# Patient Record
Sex: MALE | Race: WHITE | NOT HISPANIC OR LATINO | Employment: FULL TIME | URBAN - NONMETROPOLITAN AREA
[De-identification: names, ages, dates, MRNs, and addresses within clinical notes are randomized per-mention and may not be internally consistent; named-entity substitution may affect disease eponyms.]

---

## 2018-02-14 ENCOUNTER — OFFICE VISIT CONVERTED (OUTPATIENT)
Dept: FAMILY MEDICINE CLINIC | Age: 53
End: 2018-02-14
Attending: NURSE PRACTITIONER

## 2018-08-21 ENCOUNTER — OFFICE VISIT CONVERTED (OUTPATIENT)
Dept: FAMILY MEDICINE CLINIC | Age: 53
End: 2018-08-21
Attending: NURSE PRACTITIONER

## 2019-01-10 ENCOUNTER — HOSPITAL ENCOUNTER (OUTPATIENT)
Dept: OTHER | Facility: HOSPITAL | Age: 54
Discharge: HOME | End: 2019-01-10
Attending: NURSE PRACTITIONER

## 2019-01-10 ENCOUNTER — OFFICE VISIT CONVERTED (OUTPATIENT)
Dept: FAMILY MEDICINE CLINIC | Age: 54
End: 2019-01-10
Attending: NURSE PRACTITIONER

## 2019-01-10 LAB
ALBUMIN SERPL-MCNC: 4.9 G/DL (ref 3.5–5)
ALBUMIN/GLOB SERPL: 1.8 {RATIO} (ref 1.4–2.6)
ALP SERPL-CCNC: 80 U/L (ref 56–119)
ALT SERPL-CCNC: 30 U/L (ref 10–40)
ANION GAP SERPL CALC-SCNC: 19 MMOL/L (ref 8–19)
AST SERPL-CCNC: 19 U/L (ref 15–50)
BILIRUB SERPL-MCNC: 0.59 MG/DL (ref 0.2–1.3)
BUN SERPL-MCNC: 18 MG/DL (ref 5–25)
BUN/CREAT SERPL: 19 {RATIO} (ref 6–20)
CALCIUM SERPL-MCNC: 9.3 MG/DL (ref 8.7–10.4)
CHLORIDE SERPL-SCNC: 99 MMOL/L (ref 99–111)
CHOLEST SERPL-MCNC: 131 MG/DL (ref 107–200)
CHOLEST/HDLC SERPL: 2.8 {RATIO} (ref 3–6)
CONV CO2: 26 MMOL/L (ref 22–32)
CONV TOTAL PROTEIN: 7.6 G/DL (ref 6.3–8.2)
CREAT UR-MCNC: 0.93 MG/DL (ref 0.7–1.2)
EST. AVERAGE GLUCOSE BLD GHB EST-MCNC: 171 MG/DL
GFR SERPLBLD BASED ON 1.73 SQ M-ARVRAT: >60 ML/MIN/{1.73_M2}
GLOBULIN UR ELPH-MCNC: 2.7 G/DL (ref 2–3.5)
GLUCOSE SERPL-MCNC: 133 MG/DL (ref 70–99)
HBA1C MFR BLD: 7.6 % (ref 3.5–5.7)
HDLC SERPL-MCNC: 46 MG/DL (ref 40–60)
LDLC SERPL CALC-MCNC: 73 MG/DL (ref 70–100)
OSMOLALITY SERPL CALC.SUM OF ELEC: 294 MOSM/KG (ref 273–304)
POTASSIUM SERPL-SCNC: 4.2 MMOL/L (ref 3.5–5.3)
SODIUM SERPL-SCNC: 140 MMOL/L (ref 135–147)
TRIGL SERPL-MCNC: 60 MG/DL (ref 40–150)
VLDLC SERPL-MCNC: 12 MG/DL (ref 5–37)

## 2019-08-29 ENCOUNTER — OFFICE VISIT CONVERTED (OUTPATIENT)
Dept: FAMILY MEDICINE CLINIC | Age: 54
End: 2019-08-29
Attending: NURSE PRACTITIONER

## 2019-08-29 ENCOUNTER — HOSPITAL ENCOUNTER (OUTPATIENT)
Dept: OTHER | Facility: HOSPITAL | Age: 54
Discharge: HOME | End: 2019-08-29
Attending: NURSE PRACTITIONER

## 2019-08-29 LAB
ALBUMIN SERPL-MCNC: 4.9 G/DL (ref 3.5–5)
ALBUMIN/GLOB SERPL: 1.8 {RATIO} (ref 1.4–2.6)
ALP SERPL-CCNC: 74 U/L (ref 56–119)
ALT SERPL-CCNC: 25 U/L (ref 10–40)
ANION GAP SERPL CALC-SCNC: 21 MMOL/L (ref 8–19)
AST SERPL-CCNC: 21 U/L (ref 15–50)
BILIRUB SERPL-MCNC: 0.69 MG/DL (ref 0.2–1.3)
BUN SERPL-MCNC: 19 MG/DL (ref 5–25)
BUN/CREAT SERPL: 18 {RATIO} (ref 6–20)
CALCIUM SERPL-MCNC: 9.3 MG/DL (ref 8.7–10.4)
CHLORIDE SERPL-SCNC: 100 MMOL/L (ref 99–111)
CHOLEST SERPL-MCNC: 130 MG/DL (ref 107–200)
CHOLEST/HDLC SERPL: 3.1 {RATIO} (ref 3–6)
CONV CO2: 23 MMOL/L (ref 22–32)
CONV CREATININE URINE, RANDOM: 78.3 MG/DL (ref 10–300)
CONV MICROALBUM.,U,RANDOM: <12 MG/L (ref 0–20)
CONV TOTAL PROTEIN: 7.7 G/DL (ref 6.3–8.2)
CREAT UR-MCNC: 1.08 MG/DL (ref 0.7–1.2)
EST. AVERAGE GLUCOSE BLD GHB EST-MCNC: 223 MG/DL
GFR SERPLBLD BASED ON 1.73 SQ M-ARVRAT: >60 ML/MIN/{1.73_M2}
GLOBULIN UR ELPH-MCNC: 2.8 G/DL (ref 2–3.5)
GLUCOSE SERPL-MCNC: 175 MG/DL (ref 70–99)
HBA1C MFR BLD: 9.4 % (ref 3.5–5.7)
HDLC SERPL-MCNC: 42 MG/DL (ref 40–60)
LDLC SERPL CALC-MCNC: 78 MG/DL (ref 70–100)
MICROALBUMIN/CREAT UR: 15.3 MG/G{CRE} (ref 0–25)
OSMOLALITY SERPL CALC.SUM OF ELEC: 297 MOSM/KG (ref 273–304)
POTASSIUM SERPL-SCNC: 4.1 MMOL/L (ref 3.5–5.3)
SODIUM SERPL-SCNC: 140 MMOL/L (ref 135–147)
TRIGL SERPL-MCNC: 52 MG/DL (ref 40–150)
VLDLC SERPL-MCNC: 10 MG/DL (ref 5–37)

## 2020-01-23 ENCOUNTER — OFFICE VISIT CONVERTED (OUTPATIENT)
Dept: FAMILY MEDICINE CLINIC | Age: 55
End: 2020-01-23
Attending: FAMILY MEDICINE

## 2020-05-11 ENCOUNTER — OFFICE VISIT CONVERTED (OUTPATIENT)
Dept: FAMILY MEDICINE CLINIC | Age: 55
End: 2020-05-11
Attending: NURSE PRACTITIONER

## 2020-05-23 ENCOUNTER — HOSPITAL ENCOUNTER (OUTPATIENT)
Dept: OTHER | Facility: HOSPITAL | Age: 55
Discharge: HOME | End: 2020-05-23
Attending: NURSE PRACTITIONER

## 2020-05-23 LAB
ALBUMIN SERPL-MCNC: 4.8 G/DL (ref 3.5–5)
ALBUMIN/GLOB SERPL: 1.8 {RATIO} (ref 1.4–2.6)
ALP SERPL-CCNC: 85 U/L (ref 56–119)
ALT SERPL-CCNC: 25 U/L (ref 10–40)
ANION GAP SERPL CALC-SCNC: 18 MMOL/L (ref 8–19)
AST SERPL-CCNC: 21 U/L (ref 15–50)
BILIRUB SERPL-MCNC: 0.59 MG/DL (ref 0.2–1.3)
BUN SERPL-MCNC: 18 MG/DL (ref 5–25)
BUN/CREAT SERPL: 18 {RATIO} (ref 6–20)
CALCIUM SERPL-MCNC: 9.4 MG/DL (ref 8.7–10.4)
CHLORIDE SERPL-SCNC: 101 MMOL/L (ref 99–111)
CHOLEST SERPL-MCNC: 113 MG/DL (ref 107–200)
CHOLEST/HDLC SERPL: 2.5 {RATIO} (ref 3–6)
CONV CO2: 23 MMOL/L (ref 22–32)
CONV TOTAL PROTEIN: 7.5 G/DL (ref 6.3–8.2)
CREAT UR-MCNC: 1.01 MG/DL (ref 0.7–1.2)
EST. AVERAGE GLUCOSE BLD GHB EST-MCNC: 223 MG/DL
GFR SERPLBLD BASED ON 1.73 SQ M-ARVRAT: >60 ML/MIN/{1.73_M2}
GLOBULIN UR ELPH-MCNC: 2.7 G/DL (ref 2–3.5)
GLUCOSE SERPL-MCNC: 167 MG/DL (ref 70–99)
HBA1C MFR BLD: 9.4 % (ref 3.5–5.7)
HDLC SERPL-MCNC: 45 MG/DL (ref 40–60)
LDLC SERPL CALC-MCNC: 57 MG/DL (ref 70–100)
OSMOLALITY SERPL CALC.SUM OF ELEC: 292 MOSM/KG (ref 273–304)
POTASSIUM SERPL-SCNC: 4.3 MMOL/L (ref 3.5–5.3)
SODIUM SERPL-SCNC: 138 MMOL/L (ref 135–147)
TRIGL SERPL-MCNC: 55 MG/DL (ref 40–150)
VLDLC SERPL-MCNC: 11 MG/DL (ref 5–37)

## 2020-06-26 ENCOUNTER — HOSPITAL ENCOUNTER (OUTPATIENT)
Dept: OTHER | Facility: HOSPITAL | Age: 55
Discharge: HOME | End: 2020-06-26
Attending: NURSE PRACTITIONER

## 2020-06-26 ENCOUNTER — OFFICE VISIT CONVERTED (OUTPATIENT)
Dept: DIABETES SERVICES | Facility: HOSPITAL | Age: 55
End: 2020-06-26
Attending: NURSE PRACTITIONER

## 2020-08-29 ENCOUNTER — HOSPITAL ENCOUNTER (OUTPATIENT)
Dept: OTHER | Facility: HOSPITAL | Age: 55
Discharge: HOME | End: 2020-08-29
Attending: NURSE PRACTITIONER

## 2020-08-29 LAB
CONV CREATININE URINE, RANDOM: 74.8 MG/DL (ref 10–300)
CONV MICROALBUM.,U,RANDOM: <12 MG/L (ref 0–20)
EST. AVERAGE GLUCOSE BLD GHB EST-MCNC: 151 MG/DL
HBA1C MFR BLD: 6.9 % (ref 3.5–5.7)
MICROALBUMIN/CREAT UR: 16 MG/G{CRE} (ref 0–25)

## 2020-09-04 ENCOUNTER — OFFICE VISIT CONVERTED (OUTPATIENT)
Dept: DIABETES SERVICES | Facility: HOSPITAL | Age: 55
End: 2020-09-04
Attending: NURSE PRACTITIONER

## 2020-09-04 ENCOUNTER — HOSPITAL ENCOUNTER (OUTPATIENT)
Dept: DIABETES SERVICES | Facility: HOSPITAL | Age: 55
Discharge: HOME | End: 2020-09-04
Attending: NURSE PRACTITIONER

## 2020-11-11 ENCOUNTER — OFFICE VISIT CONVERTED (OUTPATIENT)
Dept: FAMILY MEDICINE CLINIC | Age: 55
End: 2020-11-11
Attending: NURSE PRACTITIONER

## 2020-12-11 ENCOUNTER — HOSPITAL ENCOUNTER (OUTPATIENT)
Dept: OTHER | Facility: HOSPITAL | Age: 55
Discharge: HOME | End: 2020-12-11
Attending: NURSE PRACTITIONER

## 2020-12-11 LAB
EST. AVERAGE GLUCOSE BLD GHB EST-MCNC: 148 MG/DL
HBA1C MFR BLD: 6.8 % (ref 3.5–5.7)

## 2020-12-18 ENCOUNTER — HOSPITAL ENCOUNTER (OUTPATIENT)
Dept: DIABETES SERVICES | Facility: HOSPITAL | Age: 55
Discharge: HOME | End: 2020-12-18
Attending: NURSE PRACTITIONER

## 2020-12-18 ENCOUNTER — OFFICE VISIT CONVERTED (OUTPATIENT)
Dept: DIABETES SERVICES | Facility: HOSPITAL | Age: 55
End: 2020-12-18
Attending: NURSE PRACTITIONER

## 2021-03-06 ENCOUNTER — HOSPITAL ENCOUNTER (OUTPATIENT)
Dept: OTHER | Facility: HOSPITAL | Age: 56
Discharge: HOME | End: 2021-03-06
Attending: NURSE PRACTITIONER

## 2021-03-06 LAB
EST. AVERAGE GLUCOSE BLD GHB EST-MCNC: 166 MG/DL
HBA1C MFR BLD: 7.4 % (ref 3.5–5.7)

## 2021-03-12 ENCOUNTER — OFFICE VISIT CONVERTED (OUTPATIENT)
Dept: DIABETES SERVICES | Facility: HOSPITAL | Age: 56
End: 2021-03-12
Attending: NURSE PRACTITIONER

## 2021-03-12 ENCOUNTER — HOSPITAL ENCOUNTER (OUTPATIENT)
Dept: DIABETES SERVICES | Facility: HOSPITAL | Age: 56
Discharge: HOME | End: 2021-03-12
Attending: NURSE PRACTITIONER

## 2021-05-18 NOTE — PROGRESS NOTES
"Cholo Morejon 1965     Office/Outpatient Visit    Visit Date: Wed, Feb 14, 2018 08:54 am    Provider: Miriam Moon N.P. (Assistant: Aarti Orantes MA)    Location: [facility]        Electronically signed by Miriam Moon N.P. on  02/14/2018 10:19:10 AM                             SUBJECTIVE:        CC:     Mr. Morejon is a 52 year old White male.  physical         HPI:         HEALTH RISK PROFILE (\"At Risk\" items are starred):         Smoking: Life-long non-smoker;     Nutrition: eating snack foods;     Alcohol/Drug Use: Rarely drinks alcohol; No illicit drug use;     Safety: Always wears seatbelt;         Concerning type 2 diabetes, compliance with treatment has been fair; he skips some medication doses due to forgetfulness.  Current meds include an oral hypoglycemic ( Glucophage XR ( 1000 mg BID ), invokana, and Januvia ).  He reports home blood glucose readings have averaged fasting readings in the 120-150's mg/dL range.  Most recent lab results include Weight (lb):  192.0 (02/14/2018), Systolic BP:  128 (02/14/2018), Diastolic BP:  70 (02/14/2018), Hemoglobin A1c:  8.5 (02/01/2018), LDL:  81 (mg/dL) (10/30/2017), HDL:  45 (mg/dL) (10/30/2017), Triglycerides:  54 (mg/dL) (10/30/2017), Dilated Eye Exam by date:  06/30/2017 (03/15/2017), Foot Exam (Annual):  10/30/2017 (10/30/2017).      ROS:     CONSTITUTIONAL:  Positive for fatigue.   Negative for chills or fever.      EYES:  Negative for blurred vision.      E/N/T:  Negative for nasal congestion and sore throat.      CARDIOVASCULAR:  Negative for chest pain and palpitations.      RESPIRATORY:  Negative for recent cough and dyspnea.      GASTROINTESTINAL:  Negative for abdominal pain, nausea and vomiting.      MUSCULOSKELETAL:  Negative for myalgias.      INTEGUMENTARY:  Negative for atypical mole(s) and rash.      NEUROLOGICAL:  Negative for paresthesias and weakness.      PSYCHIATRIC:  Positive for (only gets 6 hours of sleep).   " Negative for anxiety or depression.          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         Surgical History:     NONE         Family History:     Father: Coronary Artery Disease     Mother: Coronary Artery Disease;  Type 2 Diabetes     Brother(s): 2 brother(s) total; 1 ;  Lymphoma ( non Hodgkins at 19 y/o )     Sister(s): 1 sister(s) total;  Anxiety     Son(s): Healthy; 1 son(s) total     Daughter(s): Healthy; 2 daughter(s) total     Paternal Grandfather:  at age 60's; Cause of death was cancer     Paternal Grandmother:  at age 80's     Maternal Grandfather:  at age 80's     Maternal Grandmother:  at age 80's;  Type 2 Diabetes         Social History:     Occupation: [employer]     Marital Status:      Children: 3 children         Tobacco/Alcohol/Supplements:     Last Reviewed on 2018 08:58 AM by Aarti Orantes    Tobacco: He has never smoked.          Alcohol: Frequency:    rarely;         Substance Abuse History:     Last Reviewed on 2015 09:10 AM by Miriam Moon    None             Immunizations:     Boostrix (Tdap) 3/23/2016         Allergies:     Last Reviewed on 2018 08:58 AM by Aarti Orantes      No Known Drug Allergies.         Current Medications:     Last Reviewed on 2018 08:59 AM by Aarti Orantes    Invokana 300mg Tablet Take 1 tablet(s) by mouth daily before the first meal of the day.     Januvia 100mg Tablet Take 1 tablet(s) by mouth daily     Lipitor 20mg Tablet 1 tab daily     Metformin HCl 500mg Tablets, Extended Release 2 tabs bid     Glucose Reagent Blood Test Strips  Reagent Strips BID, use glucocoard expression strips         OBJECTIVE:        Vitals:         Current: 2018 8:58:24 AM    Ht:  5 ft, 9.25 in;  Wt: 192 lbs;  WC: 38 inches;  BMI:  28.1    T: 98.7 F (oral);  BP: 128/70 mm Hg (left arm, sitting);  P: 81 bpm (left arm (BP Cuff), sitting);  sCr: 1.01 mg/dL;  GFR: 86.20        Exams:     PHYSICAL EXAM:     GENERAL: Vitals recorded well developed, well nourished;  well groomed;  no apparent distress;     EYES: lids and lacrimal system are normal in appearance; extraocular movements intact; conjunctiva and cornea are normal; PERRLA;     E/N/T: EARS: external auditory canal occluded by cerumen on the left;  the right TM is scarred;     NECK:  supple, full ROM; no thyromegaly; no carotid bruits;     RESPIRATORY: normal respiratory rate and pattern with no distress; normal breath sounds with no rales, rhonchi, wheezes or rubs;     CARDIOVASCULAR: normal rate; rhythm is regular;  no systolic murmur; no edema;     GASTROINTESTINAL: nontender, nondistended; no hepatosplenomegaly or masses; no bruits;     LYMPHATICS:  no adenopathy in cervical, supraclavicular, axillary, or inguinal regions;     SKIN:  no significant rashes or lesions; no suspicious moles;     MUSCULOSKELETAL:  Normal range of motion, strength and tone;     NEUROLOGIC: GROSSLY INTACT     PSYCHIATRIC:  appropriate affect and demeanor; normal speech pattern; grossly normal memory;         ASSESSMENT           V70.0   Z00.00  Annual exam              DDx:     250.00   E11.9  Type 2 diabetes              DDx:     780.79   R53.83  Fatigue              DDx:         ORDERS:         Lab Orders:       [id number]  PHYSM - [facility] MALE PHYSICAL: CMP, CBC,LIPID, PRSAS-, TSH [id number], 31301, 02318, 22937, , 64979  (Send-Out)         77442  VITD - H Vitamin D, 25 Hydroxy  (Send-Out)           Other Orders:       [id number]  Behav assmt w/score & docd/stand instrument  (In-House)                   PLAN:          Annual exam will get colon screen later this year, his work is short staffed at this time; declines flu vaccine     LABORATORY:  Labs ordered to be performed today include MALE PHYSICAL: CMP, CBC,  LIPID, PSA, TSH.      RECOMMENDATIONS given include: Screening Colonoscopy declines.  MIPS PHQ-9 Depression Screening: Completed form scanned and in chart; Total Score 6     COUNSELING was provided today regarding the following topics: healthy eating habits, low cholesterol diet, low salt diet, regular exercise, and use of seat belts.      FOLLOW-UP: Schedule follow-up appointments on a p.r.n. basis.            Orders:       [id number]  PHYS - [facility] MALE PHYSICAL: CMP, CBC,LIPID, PRSAS-, TSH [id number], [id number], 51077, 52275, , 47956  (Send-Out)         30367  Behav assmt w/score & docd/stand instrument  (In-House)             Patient Education Handouts:       Colonoscopy           Type 2 diabetes will switch him off invokana and send his other refill's to [facility] after labs, his last A1C recently was up, he needs to make sure to take his rx every day, monitor sugars          Fatigue will check some labs today     LABORATORY:  Labs ordered to be performed today include Vitamin D.            Orders:       [id number]  VITD - [facility] Vitamin D, 25 Hydroxy  (Send-Out)               Patient Recommendations:        For  Annual exam:     Limit dietary intake of fat (especially saturated fat) and cholesterol.  Eat a variety of foods, including plenty of fruits, vegetables, and grain containg fiber, limit fat intake to 30% of total calories. Balance caloric intake with energy expended. Maintaining regular physical activity is advised to help prevent heart disease, hypertension, diabetes, and obesity. Always use shoulder/lap restraints when driving or riding in a vehicle, even those equipped with air bags.  Schedule follow-up appointments as needed.              CHARGE CAPTURE           **Please note: ICD descriptions below are intended for billing purposes only and may not represent clinical diagnoses**        Primary Diagnosis:         V70.0 Annual exam            Z00.00    Encounter for general adult medical examination without abnormal  findings              Orders:          [id number]   Preventive medicine, established patient, age 40-64 years  (In-House)             50361   Behav assmt w/score & docd/stand instrument  (In-House)           250.00 Type 2 diabetes            E11.9    Type 2 diabetes mellitus without complications              Orders:          57127 -25  Office/outpatient visit; established patient, level 2  (In-House)           780.79 Fatigue            R53.83    Other fatigue

## 2021-05-18 NOTE — PROGRESS NOTES
"Cholo Morejon  1965     Office/Outpatient Visit    Visit Date: Wed, Nov 11, 2020 08:33 am    Provider: Miriam Moon N.P. (Assistant: Nuria Ruiz MA)    Location: [facility]        Electronically signed by Miriam Moon N.P. on  11/11/2020 09:35:32 AM                             Subjective:        CC: Mr. Morejon is a 55 year old White male.  Follow up and physical for work         HPI:           Patient to be evaluated for type 2 diabetes mellitus without complications.  Current meds include an oral hypoglycemic ( Glucophage XR, Invokana ( 300 mg QD ), and Januvia ( 100 mg QD ) ) and Zestril for renoprotection.  He reports home blood glucose readings have been fairly good, with average fasting glucoses running in the 120-150 mg/dL range.  Most recent lab results include Weight (lb):  192.2 (01/23/2020), Hemoglobin A1c:  6.9 (%) (08/29/2020), LDL:  57 (mg/dL) (05/23/2020), HDL:  45 (mg/dL) (05/23/2020), Triglycerides:  55 (mg/dL) (05/23/2020), Microalbuminuria:  16.0 (mg/g creat) (08/29/2020), Dilated Eye Exam by date:  07/13/2020 (05/23/2020), Foot Exam (Annual):  01/10/2019 (01/10/2019).            Concerning mixed hyperlipidemia, current treatment includes Lipitor.  Most recent lab tests include Vitamin D, 25-Hydroxy:  37.7 (ng/mL) (08/21/2018), ALT (SGPT):  25 (U/L) (05/23/2020), AST (SGOT):  21 (U/L) (05/23/2020), Total Cholesterol:  113 (mg/dL) (05/23/2020), HDL:  45 (mg/dL) (05/23/2020), Triglycerides:  55 (mg/dL) (05/23/2020), LDL:  57 (mg/dL) (05/23/2020).            HEALTH RISK PROFILE (\"At Risk\" items are starred):         Smoking: Life-long non-smoker;     Immunization Status: Up to date;     Nutrition: Healthy, well-balanced diet;     Physical Activity: Exercises at least 3 times per week;     Alcohol/Drug Use: Rarely drinks alcohol; No illicit drug use;     Safety: Always wears seatbelt;     ROS:     CONSTITUTIONAL:  Negative for fever.      EYES:  Negative for " blurred vision.      E/N/T:  Negative for sore throat.      CARDIOVASCULAR:  Negative for chest pain, palpitations, tachycardia, orthopnea, and edema.      RESPIRATORY:  Negative for recent cough and dyspnea.      GASTROINTESTINAL:  Negative for abdominal pain.      GENITOURINARY:  Negative for dysuria.      MUSCULOSKELETAL:  Positive for back pain ( left side, lower back, intermittent  issue ).   Negative for feet pain.      INTEGUMENTARY:  Negative for rash.      NEUROLOGICAL:  Negative for dizziness, headaches, paresthesias, and weakness.      PSYCHIATRIC:  Negative for anxiety, depression, and sleep disturbances.          Past Medical History / Family History / Social History:         Last Reviewed on 2020 08:43 AM by Miriam Moon    Past Medical History:             PAST MEDICAL HISTORY         elevated blood pressures     Type 2 Diabetes     basal skin cancer left side of face / an left arm and left scapula   [facility]         PREVENTIVE HEALTH MAINTENANCE             COLORECTAL CANCER SCREENING: Up to date (colonoscopy q10y; sigmoidoscopy q5y; Cologuard q3y) was last done 10-26-18, Results are in chart; colonoscopy with the following abnormalities noted-- spastic colon/ marginal prep         Surgical History:         Procedures:    Colonoscopy (  )         Family History:     Father: Coronary Artery Disease     Mother: Coronary Artery Disease;  Type 2 Diabetes     Brother(s): 2 brother(s) total; 1 ;  Lymphoma ( non Hodgkins at 17 y/o )     Sister(s): 1 sister(s) total;  Anxiety     Son(s): Healthy; 1 son(s) total     Daughter(s): Healthy; 2 daughter(s) total     Paternal Grandfather:  at age 60's; Cause of death was cancer     Paternal Grandmother:  at age 80's     Maternal Grandfather:  at age 80's     Maternal Grandmother:  at age 80's;  Type 2 Diabetes         Social History:     Occupation: [employer]     Marital Status:      Children: 3  children         Tobacco/Alcohol/Supplements:     Last Reviewed on 11/11/2020 08:34 AM by Nuria Ruiz    Tobacco: He has never smoked.          Alcohol: Frequency:    rarely;         Substance Abuse History:     Last Reviewed on 1/23/2020 04:03 PM by Marian Brian    None         Mental Health History:     Last Reviewed on 1/23/2020 04:03 PM by Marian Brian        Communicable Diseases (eg STDs):     Last Reviewed on 1/23/2020 04:03 PM by Marian Brian        Immunizations:     Boostrix (Tdap) 3/23/2016        Allergies:     Last Reviewed on 11/11/2020 08:34 AM by Nuria Ruiz    No Known Allergies.        Current Medications:     Last Reviewed on 11/11/2020 08:34 AM by Nuria Ruiz    metFORMIN 500 mg oral Tablet, Extended Release 24 hr [TAKE 2 TABLETS BY MOUTH TWICE DAILY]    Blood Glucose Test strips  [BID, use glucocoard expression strips]    Januvia 100 mg oral tablet [TAKE ONE TABLET BY MOUTH DAILY]    Invokana 300 mg oral tablet [TAKE ONE TABLET BY MOUTH EVERY MORNING BEFORE FIRST MEAL OF THE DAY]    Lipitor 20 mg oral tablet [1 tab daily]    Vitamin D3 50,000IU Capsules [1 cap weekly for 3mos & then start 5000 IU's daily]    lisinopriL 5 mg oral tablet [1T PO QD]    meclizine 12.5 mg oral tablet [take 1-2 tablets by oral route BID prn vertigo]        Objective:        Vitals:         Current: 11/11/2020 8:36:07 AM    Ht:  5 ft, 9.25 in;  Wt: 181.2 lbs;  WC: 34.5 inches;  BMI: 26.6T: 97.9 F (temporal);  BP: 115/79 mm Hg (right arm, sitting);  P: 80 bpm (right arm (BP Cuff), sitting);  sCr: 1.01 mg/dL;  GFR: 81.38        Exams:     PHYSICAL EXAM:     GENERAL: Vitals recorded well developed, well nourished;  well groomed;  no apparent distress;     EYES: lids and lacrimal system are normal in appearance; extraocular movements intact; conjunctiva and cornea are normal; PERRLA;     E/N/T:  normal EACs, TMs, nasal/oral mucosa, teeth, gingiva, and oropharynx;     NECK:  supple, full ROM; no  thyromegaly; no carotid bruits;     RESPIRATORY: normal respiratory rate and pattern with no distress; normal breath sounds with no rales, rhonchi, wheezes or rubs;     CARDIOVASCULAR: normal rate; rhythm is regular;  no systolic murmur; no edema;     GASTROINTESTINAL: nontender; normal bowel sounds; no organomegaly;     LYMPHATIC: no enlargement of cervical or facial nodes;     SKIN:  no significant rashes or lesions; no suspicious moles;     MUSCULOSKELETAL:  Normal range of motion, strength and tone;     NEUROLOGIC: GROSSLY INTACT     PSYCHIATRIC:  appropriate affect and demeanor; normal speech pattern; grossly normal memory;         Assessment:         E11.9   Type 2 diabetes mellitus without complications       E78.2   Mixed hyperlipidemia       Z00.00   Encounter for general adult medical examination without abnormal findings           ORDERS:         Meds Prescribed:       [Refilled] lisinopriL 5 mg oral tablet [1T PO QD], #90 (ninety) tablets, Refills: 1 (one)       [Refilled] Lipitor 20 mg oral tablet [1 tab daily], #90 (ninety) tablets, Refills: 1 (one)       [Refilled] metFORMIN 500 mg oral Tablet, Extended Release 24 hr [TAKE 2 TABLETS BY MOUTH TWICE DAILY], #360 (three hundred and sixty) tablets, Refills: 1 (one)                 Plan:         Type 2 diabetes mellitus without complicationshas an order from NIKHIL Bennett for /declines flu vaccine /reviewed diabetes flow sheet        FOLLOW-UP: next month for labs           Prescriptions:       [Refilled] lisinopriL 5 mg oral tablet [1T PO QD], #90 (ninety) tablets, Refills: 1 (one)       [Refilled] metFORMIN 500 mg oral Tablet, Extended Release 24 hr [TAKE 2 TABLETS BY MOUTH TWICE DAILY], #360 (three hundred and sixty) tablets, Refills: 1 (one)         Mixed hyperlipidemia          Prescriptions:       [Refilled] Lipitor 20 mg oral tablet [1 tab daily], #90 (ninety) tablets, Refills: 1 (one)         Encounter for general adult medical examination without  abnormal findingshe has a lab order to complete labs in         COUNSELING was provided today regarding the following topics: healthy eating habits, regular exercise, and use of seat belts.              Patient Recommendations:        For  Encounter for general adult medical examination without abnormal findings:    Limit dietary intake of fat (especially saturated fat) and cholesterol.  Eat a variety of foods, including plenty of fruits, vegetables, and grain containg fiber, limit fat intake to 30% of total calories. Balance caloric intake with energy expended. Maintaining regular physical activity is advised to help prevent heart disease, hypertension, diabetes, and obesity. Always use shoulder/lap restraints when driving or riding in a vehicle, even those equipped with air bags.              Charge Capture:         Primary Diagnosis:     E11.9  Type 2 diabetes mellitus without complications     E78.2  Mixed hyperlipidemia     Z00.00  Encounter for general adult medical examination without abnormal findings           Orders:      [id number]  Preventive medicine, established patient, age 40-64 years  (In-House)

## 2021-05-18 NOTE — PROGRESS NOTES
"Cholo Morejon 1965     Office/Outpatient Visit    Visit Date: Thu, Darin 10, 2019 09:49 am    Provider: Miriam Moon N.P. (Assistant: Ana Luisa Ghosh MA)    Location: [facility]        Electronically signed by Miriam Moon N.P. on  01/10/2019 11:06:12 AM                             SUBJECTIVE:        CC:     Mr. Morejon is a 53 year old White male.  This is a follow-up visit.  diabetes check up, biometric paperwork         HPI:         HEALTH RISK PROFILE (\"At Risk\" items are starred):         Smoking: Life-long non-smoker;     Immunization Status: Up to date;     Nutrition: Healthy, well-balanced diet;     Physical Activity: Exercises at least 3 times per week;     Alcohol/Drug Use: Rarely drinks alcohol; No illicit drug use;     Safety: Always wears seatbelt;     Skin Exams: Routinely examines skin;         Concerning type 2 diabetes, current meds include an oral hypoglycemic ( Glucophage XR ( 2000 mg QD ) and Invokana, januvia ).  He reports home blood glucose readings have been fairly good, with average fasting glucoses running in the 120-150 mg/dL range.  Most recent lab results include Hemoglobin A1c:  9.0 (%) (08/21/2018), LDL:  89 (mg/dL) (08/21/2018), HDL:  41 (mg/dL) (08/21/2018), Triglycerides:  67 (mg/dL) (08/21/2018), Microalbuminuria:  22.1 (mg/g creat) (08/21/2018), Dilated Eye Exam by date:  07/09/2018 (08/21/2018), Foot Exam (Annual):  10/30/2017 (10/30/2017).          With regard to the hypercholesterolemia, current treatment includes Lipitor.  Compliance with treatment has been good; he takes his medication as directed.  He denies experiencing any hypercholesterolemia related symptoms.      ROS:     CONSTITUTIONAL:  Negative for chills and fever.      EYES:  Negative for blurred vision.      E/N/T:  Negative for nasal congestion and sore throat.      CARDIOVASCULAR:  Negative for chest pain and palpitations.      RESPIRATORY:  Negative for recent cough and dyspnea.      " GASTROINTESTINAL:  Negative for abdominal pain, nausea and vomiting.      MUSCULOSKELETAL:  Negative for myalgias.      INTEGUMENTARY:  Negative for rash.      NEUROLOGICAL:  Negative for paresthesias and weakness.      PSYCHIATRIC:  Negative for anxiety and depression.          PMH/FMH/SH:     Last Reviewed on 1/10/2019 10:02 AM by Miriam Moon    Past Medical History:             PAST MEDICAL HISTORY         elevated blood pressures     Type 2 Diabetes     basal skin cancer left side of face / an left arm and left scapula   [facility]         PREVENTIVE HEALTH MAINTENANCE             COLORECTAL CANCER SCREENING: Up to date (colonoscopy q10y; sigmoidoscopy q5y; Cologuard q3y) was last done 10-26-18, Results are in chart; colonoscopy with the following abnormalities noted-- spastic colon/ marginal prep         Surgical History:         Procedures:    Colonoscopy (  )         Family History:     Father: Coronary Artery Disease     Mother: Coronary Artery Disease;  Type 2 Diabetes     Brother(s): 2 brother(s) total; 1 ;  Lymphoma ( non Hodgkins at 19 y/o )     Sister(s): 1 sister(s) total;  Anxiety     Son(s): Healthy; 1 son(s) total     Daughter(s): Healthy; 2 daughter(s) total     Paternal Grandfather:  at age 60's; Cause of death was cancer     Paternal Grandmother:  at age 80's     Maternal Grandfather:  at age 80's     Maternal Grandmother:  at age 80's;  Type 2 Diabetes         Social History:     Occupation: [employer]     Marital Status:      Children: 3 children         Tobacco/Alcohol/Supplements:     Last Reviewed on 1/10/2019 09:51 AM by Ana Luisa Ghosh    Tobacco: He has never smoked.          Alcohol: Frequency:    rarely;         Substance Abuse History:     Last Reviewed on 2015 09:10 AM by Miriam Moon    None             Immunizations:     Boostrix (Tdap) 3/23/2016         Allergies:     Last Reviewed on 1/10/2019 09:51 AM by  Ana Luisa Ghosh      No Known Drug Allergies.         Current Medications:     Last Reviewed on 1/10/2019 09:52 AM by Ana Luisa Ghosh    Invokana 300mg Tablet Take 1 tablet(s) by mouth daily before the first meal of the day.     Januvia 100mg Tablet Take 1 tablet(s) by mouth daily     Lipitor 20mg Tablet 1 tab daily     Metformin HCl 500mg Tablets, Extended Release 2 tabs bid     Vitamin D3 50,000IU Capsules 1 cap weekly for 3mos & then start 5000 IU's daily     Glucose Reagent Blood Test Strips  Reagent Strips BID, use glucocoard expression strips         OBJECTIVE:        Vitals:         Current: 1/10/2019 9:54:16 AM    Ht:  5 ft, 9.25 in;  Wt: 190.8 lbs;  WC: 36 inches;  BMI: 28.0    T: 97.4 F (oral);  BP: 139/80 mm Hg (left arm, sitting);  P: 73 bpm (left arm (BP Cuff), sitting);  sCr: 1.02 mg/dL;  GFR: 84.21        Exams:     PHYSICAL EXAM:     GENERAL: Vitals recorded well developed, well nourished;  well groomed;  no apparent distress;     EYES: lids and lacrimal system are normal in appearance; extraocular movements intact; conjunctiva and cornea are normal; PERRLA;     E/N/T:  normal EACs, TMs, nasal/oral mucosa, teeth, gingiva, and oropharynx;     NECK:  supple, full ROM; no thyromegaly; no carotid bruits;     RESPIRATORY: normal respiratory rate and pattern with no distress; normal breath sounds with no rales, rhonchi, wheezes or rubs;     CARDIOVASCULAR: normal rate; rhythm is regular;  no systolic murmur; no edema;     GASTROINTESTINAL: nontender; normal bowel sounds; no organomegaly;     LYMPHATIC: no enlargement of cervical or facial nodes;     SKIN: skin of feet dry, slightly thickened toenails, trimmed neatly;     MUSCULOSKELETAL:  Normal range of motion, strength and tone;     NEUROLOGIC: monofilament intact to bilateral feet; GROSSLY INTACT     PSYCHIATRIC:  appropriate affect and demeanor; normal speech pattern; grossly normal memory;         ASSESSMENT           V70.0   Z00.00  Annual  physical              DDx:     250.00   E11.9  Type 2 diabetes              DDx:     272.0   E78.2  Hypercholesterolemia              DDx:         ORDERS:         Meds Prescribed:       Refill of: Invokana (Canagliflozin) 300mg Tablet Take 1 tablet(s) by mouth daily before the first meal of the day.  #90 (Ninety) tablet(s) Refills: 1       Refill of: Januvia (Sitagliptin Phosphate) 100mg Tablet Take 1 tablet(s) by mouth daily  #90 (Ninety) tablet(s) Refills: 1       Refill of: Metformin HCl 500mg Tablets, Extended Release 2 tabs bid  #360 ([place] [address] Sixty) tablet(s) Refills: 1       Lisinopril 5mg Tablet 1 tab daily  #30 (Thirty) tablet(s) Refills: 2       Refill of: Lipitor (Atorvastatin Calcium) 20mg Tablet 1 tab daily  #90 (Ninety) tablet(s) Refills: 1         Lab Orders:       [id number]  [address] [place] [address] LIPID,CMP, A1C: 77563, 86013, 23595  (Send-Out)                   PLAN:          Annual physical declines flu and hep a vaccines         COUNSELING was provided today regarding the following topics: healthy eating habits, regular exercise, and use of seat belts.      FOLLOW-UP: Schedule follow-up appointments on a p.r.n. basis.            Patient Education Handouts:       Hepatitis A           Type 2 diabetes will start him on a low dose lisinopril 5 mg /he is not due an eye exam, sees woody/updated his diabetes flow sheet      LABORATORY:  Labs ordered to be performed today include Diabetes Panel 1; CMP, Lipid, A1C.      COUNSELING: The patient was counseled concerning the relationship between diabetes control and macrovascular disease including cardiovascular, cerebrovascular and peripheral vascular disease. The patient was counseled concerning the relationship between diabetes control and retinopathy, nephropathy, and neuropathy. Advised as to the targets of pre-meal glucoses ( mg/dl) and postmeal glucoses (<140-160 mg/dl)     FOLLOW-UP: Schedule a follow-up visit in 3 months. recheck bp            Prescriptions:       Refill of: Invokana (Canagliflozin) 300mg Tablet Take 1 tablet(s) by mouth daily before the first meal of the day.  #90 (Ninety) tablet(s) Refills: 1       Refill of: Januvia (Sitagliptin Phosphate) 100mg Tablet Take 1 tablet(s) by mouth daily  #90 (Ninety) tablet(s) Refills: 1       Refill of: Metformin HCl 500mg Tablets, Extended Release 2 tabs bid  #360 ([address] [place] [address] Sixty) tablet(s) Refills: 1       Lisinopril 5mg Tablet 1 tab daily  #30 (Thirty) tablet(s) Refills: 2           Orders:       [id number]  [address] LIPID,CMP, A1C: 59709, 77191, 11000  (Send-Out)            Hypercholesterolemia           Prescriptions:       Refill of: Lipitor (Atorvastatin Calcium) 20mg Tablet 1 tab daily  #90 (Ninety) tablet(s) Refills: 1             Patient Recommendations:        For  Annual physical:     Limit dietary intake of fat (especially saturated fat) and cholesterol.  Eat a variety of foods, including plenty of fruits, vegetables, and grain containg fiber, limit fat intake to 30% of total calories. Balance caloric intake with energy expended. Maintaining regular physical activity is advised to help prevent heart disease, hypertension, diabetes, and obesity. Always use shoulder/lap restraints when driving or riding in a vehicle, even those equipped with air bags.  Schedule follow-up appointments as needed.          For  Type 2 diabetes:     Schedule a follow-up visit in 3 months.              CHARGE CAPTURE           **Please note: ICD descriptions below are intended for billing purposes only and may not represent clinical diagnoses**        Primary Diagnosis:         V70.0 Annual physical            Z00.00    Encntr for general adult medical exam w/o abnormal findings              Orders:          [id number]   Preventive medicine, established patient, age 40-64 years  (In-House)           250.00 Type 2 diabetes            E11.9    Type 2 diabetes mellitus without complications    272.0  Hypercholesterolemia            E78.2    Mixed hyperlipidemia

## 2021-05-18 NOTE — PROGRESS NOTES
Cholo Morejon 1965     Office/Outpatient Visit    Visit Date: Tue, Aug 21, 2018 08:27 am    Provider: Miriam Moon N.P. (Assistant: Ana Luisa Ghosh MA)    Location: [facility]        Electronically signed by Miriam Moon N.P. on  08/21/2018 12:53:05 PM                             SUBJECTIVE:        CC:     Mr. Morejon is a 52 year old White male.  This is a follow-up visit.  diabetes check up         HPI:         Mr. Morejon presents with hypercholesterolemia.  Current treatment includes Lipitor.  Compliance with treatment has been good; he takes his medication as directed.  He denies experiencing any hypercholesterolemia related symptoms.          In regard to the type 2 diabetes, current meds include an oral hypoglycemic ( Glucophage XR ( 2000 mg per day ), Farxiga, and januvia ).  He reports home blood glucose readings have averaged fasting readings in the 140-180 mg/dL range.  Most recent lab results include Hemoglobin A1c:  8.5 (02/01/2018), LDL:  91 (mg/dL) (02/14/2018), Microalbuminuria:  12.6 (mg/g creat) (03/15/2017), Dilated Eye Exam by date:  06/30/2017 (03/15/2017), Foot Exam (Annual):  10/30/2017 (10/30/2017).      ROS:     CONSTITUTIONAL:  Negative for chills, fatigue, fever, and weight change.      CARDIOVASCULAR:  Negative for chest pain, palpitations, tachycardia, orthopnea, and edema.      RESPIRATORY:  Negative for cough, dyspnea, and hemoptysis.      GASTROINTESTINAL:  Positive for nausea.   Negative for melena.  he had nausea a few weeks ago, symptoms lingering but improving     NEUROLOGICAL:  Negative for dizziness, headaches, paresthesias, and weakness.          PM/FM/SH:     Last Reviewed on 8/21/2018 08:51 AM by Miriam Moon    Past Medical History:             PAST MEDICAL HISTORY         elevated blood pressures     Type 2 Diabetes     basal skin cancer left side of face 9-2017         Surgical History:     NONE         Family History:     Father: Coronary  Artery Disease     Mother: Coronary Artery Disease;  Type 2 Diabetes     Brother(s): 2 brother(s) total; 1 ;  Lymphoma ( non Hodgkins at 19 y/o )     Sister(s): 1 sister(s) total;  Anxiety     Son(s): Healthy; 1 son(s) total     Daughter(s): Healthy; 2 daughter(s) total     Paternal Grandfather:  at age 60's; Cause of death was cancer     Paternal Grandmother:  at age 80's     Maternal Grandfather:  at age 80's     Maternal Grandmother:  at age 80's;  Type 2 Diabetes         Social History:     Occupation: [employer]     Marital Status:      Children: 3 children         Tobacco/Alcohol/Supplements:     Last Reviewed on 2018 08:29 AM by Ana Luisa Ghosh    Tobacco: He has never smoked.          Alcohol: Frequency:    rarely;         Substance Abuse History:     Last Reviewed on 2015 09:10 AM by Miriam Moon             Immunizations:     Boostrix (Tdap) 3/23/2016         Allergies:     Last Reviewed on 2018 08:29 AM by Ana Luisa Ghosh      No Known Drug Allergies.         Current Medications:     Last Reviewed on 2018 08:30 AM by Ana Luisa Ghosh    Metformin HCl 500mg Tablets, Extended Release 2 tabs bid     Farxiga 10mg Tablet Take 1 tablet(s) by mouth daily     Januvia 100mg Tablet Take 1 tablet(s) by mouth daily     Lipitor 20mg Tablet 1 tab daily     Vitamin D3 50,000IU Capsules 1 cap weekly for 3mos & then start 5000 IU's daily     Glucose Reagent Blood Test Strips  Reagent Strips BID, use glucocoard expression strips         OBJECTIVE:        Vitals:         Current: 2018 8:31:53 AM    Ht:  5 ft, 9.25 in;  Wt: 186.8 lbs;  BMI: 27.4    T: 98.6 F (oral);  BP: 136/78 mm Hg (left arm, sitting);  P: 79 bpm (left arm (BP Cuff), sitting);  sCr: 1.04 mg/dL;  GFR: 82.75        Exams:     PHYSICAL EXAM:     GENERAL: Vitals recorded well developed, well nourished;  well groomed;  no apparent distress;     NECK: carotid exam reveals no  bruits;     RESPIRATORY: normal respiratory rate and pattern with no distress; normal breath sounds with no rales, rhonchi, wheezes or rubs;     CARDIOVASCULAR: normal rate; rhythm is regular;  no systolic murmur; no edema;     PSYCHIATRIC:  appropriate affect and demeanor; normal speech pattern; grossly normal memory;         ASSESSMENT           272.0   E78.5  Hypercholesterolemia              DDx:     250.00   E11.9  Type 2 diabetes              DDx:     268.9   E55.9  Vitamin D deficiency, unspecified              DDx:     V76.41   Z12.12  Screening for rectal cancer              DDx:         ORDERS:         Meds Prescribed:       Refill of: Lipitor (Atorvastatin Calcium) 20mg Tablet 1 tab daily  #90 (Ninety) tablet(s) Refills: 1       Refill of: Metformin HCl 500mg Tablets, Extended Release 2 tabs bid  #360 ([place] [address] Sixty) tablet(s) Refills: 1       Refill of: Farxiga (Dapagliflozin) 10mg Tablet Take 1 tablet(s) by mouth daily  #90 (Ninety) tablet(s) Refills: 1       Refill of: Januvia (Sitagliptin Phosphate) 100mg Tablet Take 1 tablet(s) by mouth daily  #90 (Ninety) tablet(s) Refills: 1         Lab Orders:       [id number]  DIAB2 - [facility] CMP A1C LIPID AND MICRO ALBUM CR RATIO: [id number]  (Send-Out)         72887  VITD - [facility] Vitamin D, 25 Hydroxy  (Send-Out)           Procedures Ordered:       REFER  Referral to Specialist or Other Facility  (Send-Out)                   PLAN:          Hypercholesterolemia recommend hep a vaccines         RECOMMENDATIONS given include: low cholesterol/low fat diet.      FOLLOW-UP: Schedule a follow-up visit in 6 months.            Prescriptions:       Refill of: Lipitor (Atorvastatin Calcium) 20mg Tablet 1 tab daily  #90 (Ninety) tablet(s) Refills: 1           Patient Education Handouts:       Hepatitis A           Type 2 diabetes send for july eye exam /woody /reviewed diabetes flow sheet     LABORATORY:  Labs ordered to be performed today include  Diabetes Panel 2;CMP, A1C, Lipid, Microalbumin:Creatinine Ratio.            Prescriptions:       Refill of: Metformin HCl 500mg Tablets, Extended Release 2 tabs bid  #360 ([place] [address] Sixty) tablet(s) Refills: 1       Refill of: Farxiga (Dapagliflozin) 10mg Tablet Take 1 tablet(s) by mouth daily  #90 (Ninety) tablet(s) Refills: 1       Refill of: Januvia (Sitagliptin Phosphate) 100mg Tablet Take 1 tablet(s) by mouth daily  #90 (Ninety) tablet(s) Refills: 1           Orders:       [id number]  DIAB2 - [facility] CMP A1C LIPID AND MICRO ALBUM CR RATIO: [id number],39353,49050  (Send-Out)            Vitamin D deficiency, unspecified he is taking OTC 5000 IU of vit d supplement now     LABORATORY:  Labs ordered to be performed today include Vitamin D.            Orders:       [id number]  VITD - [facility] Vitamin D, 25 Hydroxy  (Send-Out)            Screening for rectal cancer he request flaget         REFERRALS:  Referral initiated to a general surgeon ( a colonoscopy ).            Orders:       REFER  Referral to Specialist or Other Facility  (Send-Out)               Patient Recommendations:        For  Hypercholesterolemia:     Reduce the amount of cholesterol and saturated fat in your diet.  Schedule a follow-up visit in 6 months.              CHARGE CAPTURE           **Please note: ICD descriptions below are intended for billing purposes only and may not represent clinical diagnoses**        Primary Diagnosis:         272.0 Hypercholesterolemia            E78.5    Hyperlipidemia, unspecified              Orders:          [id number]   Office/outpatient visit; established patient, level 4  (In-House)           250.00 Type 2 diabetes            E11.9    Type 2 diabetes mellitus without complications    268.9 Vitamin D deficiency, unspecified            E55.9    Vitamin D deficiency, unspecified    V76.41 Screening for rectal cancer            Z12.12    Encounter for screening for malignant neoplasm of rectum        ADDENDUMS:       ____________________________________    Date: 08/21/2018 09:13 AM    Author: Chelly Griffith         Visit Note Faxed to:        Percy Cruz  (General Surgery); Number [phone]

## 2021-05-18 NOTE — PROGRESS NOTES
Cholo Morejon  1965     Office/Outpatient Visit    Visit Date: Mon, May 11, 2020 09:09 am    Provider: Miriam Moon N.P. (Assistant: Shari Pereira RN)    Location: [facility]        Electronically signed by Miriam Moon N.P. on  05/11/2020 11:46:59 AM                             Subjective:        CC: Mr. Morejon is a 54 year old White male.  Medication refill--Doximity video- [phone]         HPI: Cholo consented to this telemedicine visit.    - Persons present during the telemedicine consultation include: Cholo - patient, ANEL Red APRN    - This visit is being conducted over  audio and video          Mr. Morejon presents with type 2 diabetes mellitus without complications.  Current meds include an oral hypoglycemic ( Glucophage XR and Januvia & Invokana ) and Zestril.  He does not perform home blood glucose monitoring.  Most recent lab results include Hemoglobin A1c:  9.4 (%) (08/29/2019), LDL:  78 (mg/dL) (08/29/2019), HDL:  42 (mg/dL) (08/29/2019), Triglycerides:  52 (mg/dL) (08/29/2019), Microalbuminuria:  15.3 (mg/g creat) (08/29/2019), Dilated Eye Exam by date:  07/12/2019 (08/29/2019), Foot Exam (Annual):  01/10/2019 (01/10/2019).            Mixed hyperlipidemia details; current treatment includes Lipitor.  Compliance with treatment has been fair; he does not follow a diet and exercise regimen.  He denies experiencing any hypercholesterolemia related symptoms.      ROS:     CONSTITUTIONAL:  Negative for fever.      EYES:  Negative for blurred vision.      CARDIOVASCULAR:  Negative for chest pain, palpitations, tachycardia, orthopnea, and edema.      RESPIRATORY:  Negative for recent cough and dyspnea.      GENITOURINARY:  Positive for erectile dysfunction.  has has had some issues with this     MUSCULOSKELETAL:  Negative for feet pain.      NEUROLOGICAL:  Negative for dizziness.          Past Medical History / Family History / Social History:         Last Reviewed on  2020 09:49 AM by Miriam Moon    Past Medical History:             PAST MEDICAL HISTORY         elevated blood pressures     Type 2 Diabetes     basal skin cancer left side of face / an left arm and left scapula   [facility]         PREVENTIVE HEALTH MAINTENANCE             COLORECTAL CANCER SCREENING: Up to date (colonoscopy q10y; sigmoidoscopy q5y; Cologuard q3y) was last done 10-26-18, Results are in chart; colonoscopy with the following abnormalities noted-- spastic colon/ marginal prep         Surgical History:         Procedures:    Colonoscopy (  )         Family History:     Father: Coronary Artery Disease     Mother: Coronary Artery Disease;  Type 2 Diabetes     Brother(s): 2 brother(s) total; 1 ;  Lymphoma ( non Hodgkins at 17 y/o )     Sister(s): 1 sister(s) total;  Anxiety     Son(s): Healthy; 1 son(s) total     Daughter(s): Healthy; 2 daughter(s) total     Paternal Grandfather:  at age 60's; Cause of death was cancer     Paternal Grandmother:  at age 80's     Maternal Grandfather:  at age 80's     Maternal Grandmother:  at age 80's;  Type 2 Diabetes         Social History:     Occupation: [employer]     Marital Status:      Children: 3 children         Tobacco/Alcohol/Supplements:     Last Reviewed on 2020 09:10 AM by Shari Pereira    Tobacco: He has never smoked.          Alcohol: Frequency:    rarely;         Substance Abuse History:     Last Reviewed on 2020 04:03 PM by Marian Brian    None         Mental Health History:     Last Reviewed on 2020 04:03 PM by Marian Brian        Communicable Diseases (eg STDs):     Last Reviewed on 2020 04:03 PM by Marian Brian        Immunizations:     Boostrix (Tdap) 3/23/2016        Allergies:     Last Reviewed on 2020 09:10 AM by Shari Pereira    No Known Allergies.        Current Medications:     Last Reviewed on 2020 09:10 AM by Shari Pereira    metFORMIN 500  mg oral Tablet, Extended Release 24 hr [2 tabs bid]    Blood Glucose Test strips  [BID, use glucocoard expression strips]    Januvia 100 mg oral tablet [TAKE ONE TABLET BY MOUTH DAILY]    Lipitor 20mg Tablet [1 tab daily]    Invokana 300 mg oral tablet [TAKE ONE TABLET BY MOUTH EVERY MORNING BEFORE FIRST MEAL OF THE DAY]    Vitamin D3 50,000IU Capsules [1 cap weekly for 3mos & then start 5000 IU's daily]    lisinopriL 5 mg oral tablet [1T PO QD]    meclizine 12.5 mg oral tablet [take 1-2 tablets by oral route BID prn vertigo]        Objective:        Exams:     PHYSICAL EXAM:     GENERAL: Vitals recorded well developed, well nourished;  well groomed;  no apparent distress;     RESPIRATORY: normal appearance and symmetric expansion of chest wall;     NEUROLOGIC: GROSSLY INTACT     PSYCHIATRIC:  appropriate affect and demeanor; normal speech pattern; grossly normal memory;         Assessment:         E11.9   Type 2 diabetes mellitus without complications       E78.2   Mixed hyperlipidemia           ORDERS:         Meds Prescribed:       [Refilled] metFORMIN 500 mg oral Tablet, Extended Release 24 hr [2 tabs bid], #360 (three hundred and sixty) tablets, Refills: 0 (zero)       [Refilled] Invokana 300 mg oral tablet [TAKE ONE TABLET BY MOUTH EVERY MORNING BEFORE FIRST MEAL OF THE DAY], #90 (ninety) tablets, Refills: 0 (zero)       [Refilled] Januvia 100 mg oral tablet [TAKE ONE TABLET BY MOUTH DAILY], #90 (ninety) tablets, Refills: 0 (zero)       [Refilled] lisinopriL 5 mg oral tablet [1T PO QD], #90 (ninety) tablets, Refills: 0 (zero)       [Refilled] Lipitor 20 mg oral tablet [1 tab daily], #90 (ninety) tablets, Refills: 1 (one)       [Refilled] Blood Glucose Test strips  [BID, use glucocoard expression strips], #100 (one hundred) strips, Refills: 1 (one)         Lab Orders:       FUTURE  Future order to be done at patients convenience  (Send-Out)            [id number]  [address] LIPID,CMP, A1C: 82001, [id number], [id number]   (Send-Out)                      Plan:         Type 2 diabetes mellitus without complicationshe will be due a foot exam at next visit in the office, not due eye exam until July 2020    Telehealth: Verbal consent obtained for visit to occur via televideo conferencing     FOLLOW-UP TESTING #1: FOLLOW-UP LABORATORY:  Labs to be scheduled in the future include Diabetes Panel 1; CMP, Lipid, A1C.      start testing glucose, check his feet daily           Prescriptions:       [Refilled] metFORMIN 500 mg oral Tablet, Extended Release 24 hr [2 tabs bid], #360 (three hundred and sixty) tablets, Refills: 0 (zero)       [Refilled] Invokana 300 mg oral tablet [TAKE ONE TABLET BY MOUTH EVERY MORNING BEFORE FIRST MEAL OF THE DAY], #90 (ninety) tablets, Refills: 0 (zero)       [Refilled] Januvia 100 mg oral tablet [TAKE ONE TABLET BY MOUTH DAILY], #90 (ninety) tablets, Refills: 0 (zero)       [Refilled] lisinopriL 5 mg oral tablet [1T PO QD], #90 (ninety) tablets, Refills: 0 (zero)       [Refilled] Blood Glucose Test strips  [BID, use glucocoard expression strips], #100 (one hundred) strips, Refills: 1 (one)           Orders:       FUTURE  Future order to be done at patients convenience  (Send-Out)            [id number]  [address] LIPID,CMP, A1C: [id number], [id number], [id number]  (Send-Out)              Mixed hyperlipidemia        RECOMMENDATIONS given include: exercise and low cholesterol/low fat diet.      FOLLOW-UP: fasting for labs order to be mailed to pt           Prescriptions:       [Refilled] Lipitor 20 mg oral tablet [1 tab daily], #90 (ninety) tablets, Refills: 1 (one)             Patient Recommendations:        For  Type 2 diabetes mellitus without complications:            The following laboratory testing has been ordered:         For  Mixed hyperlipidemia:    Maintain a regular exercise program. Reduce the amount of cholesterol and saturated fat in your diet.              Charge Capture:         Primary Diagnosis:     E11.9  Type 2  diabetes mellitus without complications           Orders:      78460  Office/outpatient visit; established patient, level 3  (In-House)              E78.2  Mixed hyperlipidemia

## 2021-05-18 NOTE — PROGRESS NOTES
Cholo Morejon. 1965     Office/Outpatient Visit    Visit Date: Thu, Aug 29, 2019 08:57 am    Provider: Miriam Moon N.P. (Assistant: Ana Luisa Ghosh MA)    Location: [facility]        Electronically signed by Miriam Moon N.P. on  08/29/2019 10:55:49 AM                             SUBJECTIVE:        CC:     Mr. Morejon is a 53 year old White male.  This is a follow-up visit.  check up on meds         HPI:         PHQ-9 Depression Screening: Completed form scanned and in chart; Total Score 1 Alcohol Consumption Screening: Completed form scanned and in chart; Total Score 1         In regard to the type 2 diabetes, current meds include an oral hypoglycemic ( Glucophage XR ( 2000 mg QD ) and Invokana ) and Zestril.  He reports home blood glucose readings have been fairly good, with average fasting glucoses running in the 120-150 mg/dL range.  Most recent lab results include Hemoglobin A1c:  7.6 (%) (01/10/2019), LDL:  73 (mg/dL) (01/10/2019), HDL:  46 (mg/dL) (01/10/2019), Triglycerides:  60 (mg/dL) (01/10/2019), Microalbuminuria:  22.1 (mg/g creat) (08/21/2018), Dilated Eye Exam by date:  07/09/2018 (08/21/2018), Foot Exam (Annual):  01/10/2019 (01/10/2019).          With regard to the hypercholesterolemia, current treatment includes Lipitor.  Compliance with treatment has been good; he takes his medication as directed.  He denies experiencing any hypercholesterolemia related symptoms.      ROS:     CONSTITUTIONAL:  Negative for chills, fatigue, fever, and weight change.      CARDIOVASCULAR:  Negative for chest pain, palpitations, tachycardia, orthopnea, and edema.      RESPIRATORY:  Negative for cough, dyspnea, and hemoptysis.      NEUROLOGICAL:  Negative for dizziness, headaches, paresthesias, and weakness.          PMH/FMH/SH:     Last Reviewed on 8/29/2019 09:12 AM by Miriam Moon    Past Medical History:             PAST MEDICAL HISTORY         elevated blood pressures     Type 2  Diabetes     basal skin cancer left side of face / an left arm and left scapula   [facility]         PREVENTIVE HEALTH MAINTENANCE             COLORECTAL CANCER SCREENING: Up to date (colonoscopy q10y; sigmoidoscopy q5y; Cologuard q3y) was last done 10-26-18, Results are in chart; colonoscopy with the following abnormalities noted-- spastic colon/ marginal prep         Surgical History:         Procedures:    Colonoscopy (  )         Family History:     Father: Coronary Artery Disease     Mother: Coronary Artery Disease;  Type 2 Diabetes     Brother(s): 2 brother(s) total; 1 ;  Lymphoma ( non Hodgkins at 17 y/o )     Sister(s): 1 sister(s) total;  Anxiety     Son(s): Healthy; 1 son(s) total     Daughter(s): Healthy; 2 daughter(s) total     Paternal Grandfather:  at age 60's; Cause of death was cancer     Paternal Grandmother:  at age 80's     Maternal Grandfather:  at age 80's     Maternal Grandmother:  at age 80's;  Type 2 Diabetes         Social History:     Occupation: [employer]     Marital Status:      Children: 3 children         Tobacco/Alcohol/Supplements:     Last Reviewed on 2019 09:00 AM by Ana Luisa Ghosh    Tobacco: He has never smoked.          Alcohol: Frequency:    rarely;         Substance Abuse History:     Last Reviewed on 2015 09:10 AM by Miriam Moon             Immunizations:     Boostrix (Tdap) 3/23/2016         Allergies:     Last Reviewed on 2019 09:00 AM by Ana Luisa Ghosh      No Known Drug Allergies.         Current Medications:     Last Reviewed on 2019 09:01 AM by Ana Luisa Ghosh    Invokana 300mg Tablet Take 1 tablet(s) by mouth daily before the first meal of the day.     Januvia 100mg Tablet Take 1 tablet(s) by mouth daily     Lipitor 20mg Tablet 1 tab daily     Metformin HCl 500mg Tablets, Extended Release 2 tabs bid     Vitamin D3 50,000IU Capsules 1 cap weekly for 3mos & then start  5000 IU's daily     Glucose Reagent Blood Test Strips  Reagent Strips BID, use glucocoard expression strips     Lisinopril 5mg Tablet 1 tab daily         OBJECTIVE:        Vitals:         Current: 8/29/2019 9:01:50 AM    Ht:  5 ft, 9.25 in;  Wt: 190.6 lbs;  BMI: 27.9    T: 98 F (oral);  BP: 123/72 mm Hg (left arm, sitting);  P: 80 bpm (left arm (BP Cuff), sitting);  sCr: 0.93 mg/dL;  GFR: 92.32        Exams:     PHYSICAL EXAM:     GENERAL: Vitals recorded well developed, well nourished;  well groomed;  no apparent distress;     NECK: carotid exam reveals no bruits;     RESPIRATORY: normal respiratory rate and pattern with no distress; normal breath sounds with no rales, rhonchi, wheezes or rubs;     CARDIOVASCULAR: normal rate; rhythm is regular;  no systolic murmur; no edema;     PSYCHIATRIC:  appropriate affect and demeanor; normal speech pattern; grossly normal memory;         ASSESSMENT           V79.0   Z13.31  Screening for depression              DDx:     250.00   E11.9  Type 2 diabetes              DDx:     272.0   E78.2  Hypercholesterolemia              DDx:         ORDERS:         Meds Prescribed:       Refill of: Invokana (Canagliflozin) 300mg Tablet Take 1 tablet(s) by mouth daily before the first meal of the day.  #90 (Ninety) tablet(s) Refills: 1       Refill of: Januvia (Sitagliptin Phosphate) 100mg Tablet Take 1 tablet(s) by mouth daily  #90 (Ninety) tablet(s) Refills: 1       Refill of: Metformin HCl 500mg Tablets, Extended Release 2 tabs bid  #360 ([place] and Sixty) tablet(s) Refills: 1       Refill of: Lipitor (Atorvastatin Calcium) 20mg Tablet 1 tab daily  #90 (Ninety) tablet(s) Refills: 1       Refill of: Lisinopril 5mg Tablet 1 tab daily  #90 (Ninety) tablet(s) Refills: 1         Lab Orders:       [id number]  DIAB - [facility] CMP A1C LIPID AND MICRO ALBUM CR RATIO: [id number]  (Send-Out)           Other Orders:         Depression screen negative  (In-House)            Negative EtOH screen  (In-House)                   PLAN:          Screening for depression     MIPS PHQ-9 Depression Screening: Completed form scanned and in chart; Total Score 1; Negative Depression Screen Negative alcohol screen           Orders:         Depression screen negative  (In-House)           Negative EtOH screen  (In-House)            Type 2 diabetes send for last eye exam done recently by  dr morel (7-2019) reviewed diabetes flow sheet      LABORATORY:  Labs ordered to be performed today include Diabetes Panel 2;CMP, A1C, Lipid, Microalbumin:Creatinine Ratio.      RECOMMENDATIONS: instructed in use of glucometer ( start checking fasting more regularly ).      FOLLOW-UP: pending lab results           Prescriptions:       Refill of: Invokana (Canagliflozin) 300mg Tablet Take 1 tablet(s) by mouth daily before the first meal of the day.  #90 (Ninety) tablet(s) Refills: 1       Refill of: Januvia (Sitagliptin Phosphate) 100mg Tablet Take 1 tablet(s) by mouth daily  #90 (Ninety) tablet(s) Refills: 1       Refill of: Metformin HCl 500mg Tablets, Extended Release 2 tabs bid  #360 (Three [place] and Sixty) tablet(s) Refills: 1       Refill of: Lisinopril 5mg Tablet 1 tab daily  #90 (Ninety) tablet(s) Refills: 1           Orders:       [id number]  DIAB - [facility] CMP A1C LIPID AND MICRO ALBUM CR RATIO: [id number]  (Send-Out)            Hypercholesterolemia           Prescriptions:       Refill of: Lipitor (Atorvastatin Calcium) 20mg Tablet 1 tab daily  #90 (Ninety) tablet(s) Refills: 1             Patient Recommendations:        For  Type 2 diabetes:     Obtain instruction in the proper use of a home blood glucose monitor.              CHARGE CAPTURE           **Please note: ICD descriptions below are intended for billing purposes only and may not represent clinical diagnoses**        Primary Diagnosis:         V79.0 Screening for depression            Z13.31    Encounter for screening for  depression              Orders:          84609   Office/outpatient visit; established patient, level 4  (In-House)                Depression screen negative  (In-House)                Negative EtOH screen  (In-House)           250.00 Type 2 diabetes            E11.9    Type 2 diabetes mellitus without complications    272.0 Hypercholesterolemia            E78.2    Mixed hyperlipidemia

## 2021-05-18 NOTE — PROGRESS NOTES
"Cholo Morejon  1965     Office/Outpatient Visit    Visit Date: Thu, Jan 23, 2020 03:49 pm    Provider: Marian Brian MD (Assistant: Kamille Turcios MA)    Location: [facility]        Electronically signed by Marian Brian MD on  01/23/2020 04:27:21 PM                             Subjective:        CC: Mr. Morejon is a 54 year old White male.  Patient presents today with complaints of dizziness and \"foggy vision\" X 3 days         HPI: Cholo is here today with complaint of dizziness and blurred vision for the past 3 days.  He feels like \"things are floating and the floors are not level.\"  If he looks down to the floor, \"things seem floaty.\"  +Nausea, no vomiting.  Mild blurred vision.  No fevers or chills.  Moderate fatigue for the past 3 days.  No CP or SOB.  He has had a couple episodes where he feels lightheaded.  Mild abdominal discomfort.  No urinary frequency.  No dysuria.  He has been checking BG in the morning -- it's been 125-145 fasting lately.  No diarrhea.  Worse with changes in position.  Also triggered by rolling over in bed.  Does not seem to be worse between one side and another.        Last A1c was 9.4 back in August.    ROS:     CONSTITUTIONAL:  Positive for fatigue.   Negative for fever.      EYES:  Positive for blurred vision.      E/N/T:  Negative for diminished hearing and nasal congestion.      CARDIOVASCULAR:  Negative for chest pain and palpitations.      RESPIRATORY:  Negative for recent cough and dyspnea.      GASTROINTESTINAL:  Negative for abdominal pain, constipation, diarrhea, nausea and vomiting.      MUSCULOSKELETAL:  Negative for arthralgias and myalgias.      NEUROLOGICAL:  Positive for dizziness.          Past Medical History / Family History / Social History:         Last Reviewed on 1/23/2020 04:03 PM by Marian Brian    Past Medical History:             PAST MEDICAL HISTORY         elevated blood pressures     Type 2 Diabetes     basal skin cancer " left side of face / an left arm and left scapula   [facility]         PREVENTIVE HEALTH MAINTENANCE             COLORECTAL CANCER SCREENING: Up to date (colonoscopy q10y; sigmoidoscopy q5y; Cologuard q3y) was last done 10-26-18, Results are in chart; colonoscopy with the following abnormalities noted-- spastic colon/ marginal prep         Surgical History:         Procedures:    Colonoscopy (  )         Family History:     Father: Coronary Artery Disease     Mother: Coronary Artery Disease;  Type 2 Diabetes     Brother(s): 2 brother(s) total; 1 ;  Lymphoma ( non Hodgkins at 17 y/o )     Sister(s): 1 sister(s) total;  Anxiety     Son(s): Healthy; 1 son(s) total     Daughter(s): Healthy; 2 daughter(s) total     Paternal Grandfather:  at age 60's; Cause of death was cancer     Paternal Grandmother:  at age 80's     Maternal Grandfather:  at age 80's     Maternal Grandmother:  at age 80's;  Type 2 Diabetes         Social History:     Occupation: [employer]     Marital Status:      Children: 3 children         Tobacco/Alcohol/Supplements:     Last Reviewed on 2020 04:03 PM by Marian Brian    Tobacco: He has never smoked.          Alcohol: Frequency:    rarely;         Substance Abuse History:     Last Reviewed on 2020 04:03 PM by Marian Brian    None         Mental Health History:     Last Reviewed on 2020 04:03 PM by Marian Brian        Communicable Diseases (eg STDs):     Last Reviewed on 2020 04:03 PM by Marian Brian        Current Problems:     Type 2 diabetes mellitus without complications    Type 2 diabetes    Mixed hyperlipidemia    Hypercholesterolemia    Elevated SGPT (ALT)    Abnormal serum enzyme level, unspecified    Fatigue    Other fatigue    Vitamin D deficiency, unspecified    Vitamin D deficiency, unspecified        Immunizations:     Boostrix (Tdap) 3/23/2016        Allergies:     Last Reviewed on 2019 09:00 AM by  Ana Luisa Ghosh    No Known Allergies.        Current Medications:     Last Reviewed on 8/29/2019 09:01 AM by Ana Luisa Ghosh    Metformin HCl 500mg Tablets, Extended Release [2 tabs bid]    Glucose Reagent Blood Test Strips  Reagent Strips [BID, use glucocoard expression strips]    Januvia 100mg Tablet [Take 1 tablet(s) by mouth daily]    Invokana 300mg Tablet [Take 1 tablet(s) by mouth daily before the first meal of the day.]    Lipitor 20mg Tablet [1 tab daily]    Vitamin D3 50,000IU Capsules [1 cap weekly for 3mos & then start 5000 IU's daily]        Objective:        Vitals:         Current: 1/23/2020 3:53:41 PM    Ht:  5 ft, 9.25 in;  Wt: 192.2 lbs;  BMI: 28.2T: 97.9 F (oral);  BP: 123/87 mm Hg (left arm, sitting);  P: 90 bpm (left arm (BP Cuff), sitting);  sCr: 1.08 mg/dL;  GFR: 78.91        Exams:     PHYSICAL EXAM:     GENERAL: Vitals recorded well developed, well nourished;  well groomed;  no apparent distress;     EYES: extraocular movements intact; conjunctiva and cornea are normal; PERRLA;     E/N/T: OROPHARYNX:  normal mucosa, dentition, gingiva, and posterior pharynx; +[place] positive on R;     RESPIRATORY: normal respiratory rate and pattern with no distress; normal breath sounds with no rales, rhonchi, wheezes or rubs;     CARDIOVASCULAR: normal rate; rhythm is regular;  no systolic murmur; no edema;     GASTROINTESTINAL: nontender; normal bowel sounds;     MUSCULOSKELETAL: normal gait; normal overall tone         Assessment:         H81.11   Benign paroxysmal vertigo, right ear       Z13.31   Encounter for screening for depression           ORDERS:         Meds Prescribed:       [New Rx] meclizine 12.5 mg oral tablet [take 1-2 tablets by oral route BID prn vertigo], #20 (twenty) tablets, Refills: 0 (zero)         Other Orders:         Depression screen negative  (In-House)                      Plan:         Benign paroxysmal vertigo, right ear+R sided BPPV.  Treating with  meclizine prn (caution sedation) and Epley maneuvers.  Handout given and explained.  If he has seen no improvement in one week, call back and we will get him into PT for Epley maneuvers there.    [facility] PHQ-9 Depression Screening: Completed form scanned and in chart; Total Score 1; Negative Depression Screen           Prescriptions:       [New Rx] meclizine 12.5 mg oral tablet [take 1-2 tablets by oral route BID prn vertigo], #20 (twenty) tablets, Refills: 0 (zero)           Orders:         Depression screen negative  (In-House)                  Charge Capture:         Primary Diagnosis:     H81.11  Benign paroxysmal vertigo, right ear           Orders:      [id number]  Office/outpatient visit; established patient, level 3  (In-House)              Depression screen negative  (In-House)              Z13.31  Encounter for screening for depression

## 2021-05-28 VITALS
RESPIRATION RATE: 18 BRPM | SYSTOLIC BLOOD PRESSURE: 111 MMHG | OXYGEN SATURATION: 98 % | TEMPERATURE: 97.2 F | TEMPERATURE: 96.7 F | DIASTOLIC BLOOD PRESSURE: 76 MMHG | OXYGEN SATURATION: 100 % | BODY MASS INDEX: 25.85 KG/M2 | SYSTOLIC BLOOD PRESSURE: 113 MMHG | SYSTOLIC BLOOD PRESSURE: 113 MMHG | BODY MASS INDEX: 26.27 KG/M2 | BODY MASS INDEX: 24.95 KG/M2 | RESPIRATION RATE: 18 BRPM | WEIGHT: 183.5 LBS | HEART RATE: 66 BPM | HEIGHT: 70 IN | TEMPERATURE: 97.4 F | HEART RATE: 66 BPM | HEIGHT: 70 IN | WEIGHT: 178.19 LBS | WEIGHT: 180.56 LBS | HEART RATE: 76 BPM | DIASTOLIC BLOOD PRESSURE: 71 MMHG | DIASTOLIC BLOOD PRESSURE: 73 MMHG | RESPIRATION RATE: 16 BRPM | HEIGHT: 71 IN

## 2021-05-28 NOTE — PROGRESS NOTES
Patient: ANJANA WHARTON     Acct: [id number]     Report: #[id number]  UNIT #: [id number]     : 1965    Encounter Date:2020  PRIMARY CARE: NIYAH CHRISTIAN  ***Lena***  --------------------------------------------------------------------------------------------------------------------  Date of Encounter      Dec 18, 2020            Chief Complaint      DIABETES MELLITUS TYPE II            Referring Providers/Copies To      Referring Provider:  NIYAH CHRISTIAN      Copies To:   NIYAH CHRISTIAN            Allergies      Coded Allergies:             NO KNOWN DRUG ALLERGIES (Verified  Allergy, Unknown, 20)            Medications      Last Reconciled on 20 4:42 pm by JAN PORRAS      SITagliptin (Januvia) 100 Mg Tablet      100 MG PO QDAY, #30 TAB 0 Refills         Reported         20       Lisinopril* (Lisinopril*) 5 Mg Tablet      5 MG PO QDAY, #30 TAB 0 Refills         Reported         20       Canagliflozin (INVOKANA) 300 Mg Tab      300 MG PO QDAY, #30 TAB 0 Refills         Reported         20       Atorvastatin (Atorvastatin) 20 Mg Tablet      20 MG PO HS, #30 TAB 0 Refills         Reported         20       Metformin HCl (Metformin HCl) 1,000 Mg Tablet      1000 MG PO BID for 30 Days, #60 TAB         Reported         20            Vital Signs      Height 5 ft 10 in / 177.8 cm      Weight 180 lbs 9 oz / 81.186264 kg      BSA 2.00 m2      BMI 25.9 kg/m2      Temperature 97.2 F / 36.22 C - Temporal      Pulse 66      Respirations 16      Blood Pressure 113/73 Sitting, Right Arm      Pulse Oximetry 100%, Room air            Eye Exam      When was your last eye exam?:              Where was it done?:        [facility]            Pain Score      Experiencing any pain?:  No            Preventative      Hx Influenza Vaccination:  No      Influenza Vaccine Declined:  Yes      Have You Had 2 or More Falls i:  No      Have You Had a Fall with an In:  No      Hx  Pneumococcal Vaccination:  No      Encouraged to follow-up with:  PCP regarding preventative exams.      Chart initiated by:      Michelle Torres MA            HPI - Diabetes      This patient is seen in the office today for follow-up evaluation for diabetes     medication management.  She is a 55-year-old female patient with a history of     type 2 diabetes without complications.  She is currently managed taking Invokana    300 mg once a day, metformin 1000 mg twice a day, and Januvia 100 mg once a day.     She states her glucose levels have been fairly stable with an occasional high     glucose level which she attributes to poor dietary choices.  She denies any     episodes of hypoglycemia.  She does report she has had quite a bit of stress at     home recently but otherwise denies any changes into her health.            Most Recent Lab Findings      Most Recent HGA1C      An A1c was collected on December 11, 2020 and was 6.8% indicating controlled     type 2 diabetes.            Item Value  Date Time             Hemoglobin A1c 6.8 % H 12/11/20 1054            Urine microalbumin is within normal limits            Item Value  Date Time             Urine Random Microalbumin <12.0 mg/L 8/29/20 0819            Diabetes Type:  Type 2      Diabetes Complications:  None      Number of Years?:  10      Hospitalizations 2nd to DM?:  No      ER/911 Secondary to DM:  No      Dietary Habits:  3 Meals daily (using less portions), Snacks      Exercise:  None (very active at work)      BG Monitoring:  Meter      Frequency:  Times per day (1-2)      Blood Glucose Range:        She reports most glucose levels are less than 140 mg/dL            PAST,FAMILY,   Past Medical History      Past Medical History:  Hyperlipidemia, Hypertension            Past Surgical History      Past Surgical History:  None            Past Family History      TYPE 2 DM, CV Disease, Stroke            Social History      Lives independently:  Yes       Occupation:  supervisor in factory            Tobacco Use      Smoking status:  Never smoker            Vaping      Currently Vaping:  No            Alcohol Use      Occassional            Substance Use      Substance Use:  Denies Use            Review of Sytems      General:  No Appetite Changes, No Fatigue, No Weight Loss, No Weight Gain, No     Weakness      Eyes:  No Blurred Vision, No Corrective Lenses, No Vision Changes, No Vision     Loss      Cardiovascular:  No Chest Pain, No Palpitations, No Peripheral edema      Gastrointestinal:  No Constipation, No Diarrhea, No Nausea/Vomiting      Integumentary:  No Lesions, No Rash, No Wounds      Neurologic:  No Extremity Pain, No Numbness, No Tingling, No Headache, No     Dizziness      Psychiatric:  No Anxiety, No Depression, No Stress      Endocrine:  No Hypoglycemia, No Hypo Unawareness, No Nocturnal Hypo, No     Polyuria, No Polyphagia, No Polydipsia      ENT:  No Hearing loss, No Sinusitis, No Difficulty swallowing      Respiratory:  No Shortness of breath, No Wheezing, No Cough      Genitourinary:  No UTI, No Vaginal yeast infections, No Difficulty urinating, No    Incontinence      Musculoskeletal:  No Joint pain, No Muscle pain; Back pain            Exam      Constitutional:  Awake and Alert, Appropriately dressed/groomed; Not Acutely     Distressed      Eyes:  No Scleral Icterus; Intact EOM; No Eyelid swelling      Cardiovascular:  No Peripheral Edema; Normal Chest Appearance, Normal Heart     Tones S1 S2      Musculoskeletal:  Steady Gait, Normal Strength, Normal ROM,       Respiratory:  No Respiratory Distress, No Cyanosis, No Accessory Muscle use      Skin:  No Blisters, No Cuts\Scrapes, No Acanthosis Nigricans, No DM Dermopathy,     No Fungus, No NLD, No Rash, No Vitiligo      Psychiatric:  Appropriate Affect, Intact Judgement, Oriented x 3,       Gastrointestinal:  Abdomen Soft; No Abdominal tenderness, No Abdominal     distention      ENMT:   Nose/ears normal, Good dentition, Normal hearing      Extremities:  No Cyanosis, No Edema; Normal temperature; No Deformity            Impression      Type 2 diabetes controlled with hyperlipidemia and hypertension            Diagnosis      Type 2 diabetes mellitus without complications - E11.9            Notes      New Diagnostics      * Hemoglobin A1c, Routine         Dx: Type 2 diabetes mellitus without complications - E11.9            Plan      The patient will be scheduled for routine follow-up appointment in 3 months.      She will continue to monitor her glucose levels 1-2 times each day.  She will     contact her office if she experiences any problematic glucose levels.  We will     collect a hemoglobin A1c prior to her follow-up appointment.            Pain Plan      Pain Zero Today            Electronically signed by JAN PORRAS  12/22/2020 11:57       Disclaimer: Converted document may not contain table formatting or lab diagrams. Please see [employer] [facility] for the authenticated document.

## 2021-05-28 NOTE — PROGRESS NOTES
Patient: ANJANA WHARTON     Acct: [id number]     Report: #[id number]  UNIT #: [id number]     : 1965    Encounter Date:2021  PRIMARY CARE: NIYAH CHRISTIAN  ***Lena***  --------------------------------------------------------------------------------------------------------------------  Date of Encounter      Mar 12, 2021            Chief Complaint      DIABETES MELLITUS TYPE II            Referring Providers/Copies To      Referring Provider:  NIYAH CHRISTIAN      Copies To:   NIYAH CHRISTIAN            Allergies      Coded Allergies:             NO KNOWN DRUG ALLERGIES (Verified  Allergy, Unknown, 20)            Medications      Last Reconciled on 20 4:42 pm by JAN PORRAS      SITagliptin (Januvia) 100 Mg Tablet      100 MG PO QDAY, #30 TAB 0 Refills         Reported         20       Lisinopril* (Lisinopril*) 5 Mg Tablet      5 MG PO QDAY, #30 TAB 0 Refills         Reported         20       Canagliflozin (INVOKANA) 300 Mg Tab      300 MG PO QDAY, #30 TAB 0 Refills         Reported         20       Atorvastatin (Atorvastatin) 20 Mg Tablet      20 MG PO HS, #30 TAB 0 Refills         Reported         20       Metformin HCl (Metformin HCl) 1,000 Mg Tablet      1000 MG PO BID for 30 Days, #60 TAB         Reported         20            Vital Signs      Height 5 ft 10 in / 177.8 cm      Weight 183 lbs 8 oz / 83.258839 kg      BSA 2.01 m2      BMI 26.3 kg/m2      Temperature 97.4 F / 36.33 C - Temporal      Pulse 76      Respirations 18      Blood Pressure 113/76 Sitting, Right Arm      Pulse Oximetry 98%, ROOM AIR            Eye Exam      When was your last eye exam?:              Where was it done?:        [facility]            Pain Score      Experiencing any pain?:  No            Preventative      Hx Influenza Vaccination:  No      Influenza Vaccine Declined:  Yes      Have You Had 2 or More Falls i:  No      Have You Had a Fall with an In:  No      Hx  Pneumococcal Vaccination:  No      Encouraged to follow-up with:  PCP regarding preventative exams.      Chart initiated by:      Michelle Torres MA            HPI - Diabetes      This patient is seen in the office today for follow-up evaluation for diabetes     medication management.  He is a 55-year-old male patient with a history of type     2 diabetes without complications.  He is currently managed using Invokana 300 mg    once a day, Januvia 100 mg once a day, and metformin 1000 mg twice a day.  The     patient states recently he has been eating poorly and he has noticed that he has    had some high glucose levels in the 170s at times because of dietary behavior.      He states he is trying to get back in line with his diet.            Most Recent Lab Findings      Most Recent HGA1C      An A1c was collected on March 6, 2021 was 7.4% indicating uncontrolled type 2     diabetes.  This is up from the previous result of 6.8% collected in December 2020.  Again, the patient attributes this to poor dietary behavior over the last    few months.            Item Value  Date Time             Hemoglobin A1c 7.4 % H 3/6/21 0856            Item Value  Date Time             Hemoglobin A1c 6.8 % H 12/11/20 1054            Diabetes Type:  Type 2      Diabetes Complications:  None      Number of Years?:  10      Hospitalizations 2nd to DM?:  No      ER/911 Secondary to DM:  No      Dietary Habits:  3 Meals daily (using less portions), Snacks      Exercise:  None (very active at work)      BG Monitoring:  Meter      Frequency:  Times per day (1-2)      Blood Glucose Range:        140-150; had some higher numbers about a month ago            PAST,FAMILY,   Past Medical History      Past Medical History:  Hyperlipidemia, Hypertension            Past Surgical History      Past Surgical History:  None            Past Family History      TYPE 2 DM, CV Disease, Stroke            Social History      Lives independently:  Yes       Occupation:  supervisor in factory            Tobacco Use      Smoking status:  Never smoker            Vaping      Currently Vaping:  No            Alcohol Use      Occassional            Substance Use      Substance Use:  Denies Use            Review of Sytems      General:  No Appetite Changes, No Fatigue, No Weight Loss; Weight Gain (5 pounds    since last visit); No Weakness      Eyes:  No Blurred Vision; Corrective Lenses; No Vision Changes, No Vision Loss      Cardiovascular:  No Chest Pain, No Palpitations, No Peripheral edema      Gastrointestinal:  No Constipation, No Diarrhea, No Nausea/Vomiting      Integumentary:  No Lesions, No Rash, No Wounds      Neurologic:  No Extremity Pain, No Numbness, No Tingling, No Headache, No     Dizziness      Psychiatric:  No Anxiety, No Depression; Stress      Endocrine:  No Hypoglycemia, No Hypo Unawareness, No Nocturnal Hypo, No     Polyuria, No Polyphagia, No Polydipsia      ENT:  Hearing loss; No Sinusitis, No Difficulty swallowing      Respiratory:  No Shortness of breath, No Wheezing, No Cough      Genitourinary:  No UTI, No Vaginal yeast infections, No Difficulty urinating, No    Incontinence      Musculoskeletal:  No Joint pain, No Muscle pain, No Back pain            Exam      Constitutional:  Awake and Alert, Appropriately dressed/groomed; Not Acutely     Distressed      Eyes:  No Scleral Icterus; Intact EOM; No Eyelid swelling      Cardiovascular:  No Peripheral Edema; Normal Chest Appearance      Musculoskeletal:  Steady Gait, Normal Strength, Normal ROM,       Respiratory:  No Respiratory Distress, No Cyanosis, No Accessory Muscle use      Skin:  No Blisters, No Cuts\Scrapes, No Acanthosis Nigricans, No DM Dermopathy,     No Fungus, No NLD, No Rash, No Vitiligo      Psychiatric:  Appropriate Affect, Intact Judgement, Oriented x 3,       ENMT:  Nose/ears normal, Normal hearing      Extremities:  No Cyanosis, No Edema; Normal temperature; No Deformity             Impression      Type 2 diabetes uncontrolled, hyperlipidemia, hypertension            Diagnosis      Type 2 diabetes mellitus with hyperglycemia - E11.65            Notes      Renewed Medications      * CANAGLIFLOZIN (INVOKANA) 300 MG TAB:         From: 300 MG PO QDAY #30         To: 300 MG PO QDAY 30 Days #30         Instructions: Take before breakfast.         Dx: Type 2 diabetes mellitus with hyperglycemia - E11.65      New Diagnostics      * Hemoglobin A1c, Routine         Dx: Type 2 diabetes mellitus with hyperglycemia - E11.65      * Micro Alb UA H, Routine         Dx: Type 2 diabetes mellitus with hyperglycemia - E11.65            Plan      We made no changes to the patient's current medication plan.  He is to focus on     his dietary behavior to control his glucose levels.  We will schedule him for     routine follow-up appointment in 3 months.  We will collect a repeat A1c at the     time of his follow-up as well as a urine microalbumin.  He is encouraged to     monitor his glucose levels 2-3 times a day and record them for review at the     follow-up appointment.            Pain Plan      Pain Zero Today            Electronically signed by JAN PORRAS  03/21/2021 21:18       Disclaimer: Converted document may not contain table formatting or lab diagrams. Please see [employer] [facility] for the authenticated document.

## 2021-05-28 NOTE — PROGRESS NOTES
Patient: ANJANA WHARTON     Acct: [id number]     Report: #[id number]  UNIT #: [id number]     : 1965    Encounter Date:2020  PRIMARY CARE: NIYAH CHRISTIAN  ***Lena***  --------------------------------------------------------------------------------------------------------------------  Date of Encounter      Sep 4, 2020            Chief Complaint      DIABETES MELLITUS TYPE II            Referring Providers/Copies To      Referring Provider:  NIYAH CHRISTIAN      Copies To:   NIYAH CHRISTIAN            Allergies      Coded Allergies:             No Known Drug Allergies (Verified  Allergy, 20)            Medications      Last Reconciled on 20 4:42 pm by JAN PORRAS      SITagliptin (Januvia) 100 Mg Tablet      100 MG PO QDAY, #30 TAB 0 Refills         Reported         20       Lisinopril* (Lisinopril*) 5 Mg Tablet      5 MG PO QDAY, #30 TAB 0 Refills         Reported         20       Canagliflozin (INVOKANA) 300 Mg Tab      300 MG PO QDAY, #30 TAB 0 Refills         Reported         20       Atorvastatin (Atorvastatin) 20 Mg Tablet      20 MG PO HS, #30 TAB 0 Refills         Reported         20       metFORMIN HCl (metFORMIN HCl) 1,000 Mg Tablet      1000 MG PO BID for 30 Days, #60 TAB         Reported         20            Vital Signs      Height 5 ft 10.5 in / 179.07 cm      Weight 178 lbs 3 oz / 80.862027 kg      BSA 2.00 m2      BMI 25.2 kg/m2      Temperature 96.7 F / 35.94 C - Temporal      Pulse 66      Respirations 18      Blood Pressure 111/71 Sitting, Right Arm            Eye Exam      When was your last eye exam?:              Where was it done?:        [facility]            Pain Score      Experiencing any pain?:  No            Preventative      Hx Influenza Vaccination:  No      Influenza Vaccine Declined:  Yes      Have You Had 2 or More Falls i:  No      Have You Had a Fall with an In:  No      Hx Pneumococcal Vaccination:  No      Encouraged  to follow-up with:  PCP regarding preventative exams.      Chart initiated by:      Michelle Torres MA            HPI - Diabetes      This patient is seen in the office today for follow-up evaluation for diabetes     medication management.  He is a 54-year-old male patient with a history of type     2 diabetes uncontrolled without complications.  He is currently managed using     metformin 1000 mg twice daily, Invokana 300 mg daily and Januvia 100 mg daily.      At his last appointment we made no changes in the patient's medication plan as     the patient had implemented significant dietary changes as well as increased     physical activity to promote glucose control.  The patient had a repeat A1c     which shows a remarkable improvement in the A1c down from her prior result of     9.4% to 6.9%.            He denies any other health changes or conditions at this time            Most Recent Lab Findings      Most Recent HGA1C      A1c collected on August 29, 2020 was 6.9% indicating controlled type 2 diabetes     and substantial improvement over the prior A1c of 9.4% collected in May of this     year.            Item Value  Date Time             Hemoglobin A1c 9.4 % H 5/23/20 0818            Item Value  Date Time             Hemoglobin A1c 6.9 % H 8/29/20 0819            Item Value  Date Time             Urine Random Microalbumin <12.0 mg/L 8/29/20 0819            Diabetes Type:  Type 2      Diabetes Complications:  None      Number of Years?:  10      Hospitalizations 2nd to DM?:  No      ER/911 Secondary to DM:  No      Dietary Habits:  3 Meals daily (using less portions), Snacks      Exercise:  None (very active at work)      BG Monitoring:  Meter      Frequency:  Times per day (1-3)      Blood Glucose Range:        fasting 120-140s; stays in the 100's            PAST,FAMILY,   Past Medical History      Past Medical History:  Hyperlipidemia, Hypertension            Past Surgical History      Past Surgical History:   None            Past Family History      TYPE 2 DM, CV Disease, Stroke            Social History      Lives independently:  Yes      Occupation:  supervisor in factory            Tobacco Use      Smoking status:  Never smoker            Vaping      Currently Vaping:  No            Alcohol Use      Occassional            Substance Use      Substance Use:  Denies Use            Review of Sytems      General:  No Appetite Changes, No Fatigue, No Weight Loss, No Weight Gain      Eyes:  Negative for Blurred Vision; Positive for Corrective Lenses; Negative for    Vision Changes      Cardiovascular:  No Chest Pain, No Palpitations      Gastrointestinal:  No Constipation, No Diarrhea, No Nausea/Vomiting      Integumentary:  No Lesions, No Rash, No Wounds      Neurologic:  No Extremity Pain, No Numbness, No Tingling      Psychiatric:  No Anxiety, No Depression      Endocrine:  Hypoglycemia (He denies any episodes of hypoglycemia); No Hypo     Unawareness, No Nocturnal Hypo, No Polyuria, No Polyphagia, No Polydipsia            Exam      General:  Awake and Alert, Appropriately dressed/groomed, No Acute Distress,       Eyes:  Sclera Non-Icteric, EOM Intact, Eyelids without swelling,       Cardiovascular:  No Peripheral Edema, Normal Chest Appearance, Hear Tones Normal    S1 S2,       Musculoskeletal:  Steady Gait, Normal Strength, Normal ROM,       Respiratory:  No Respiratory Distress, No Cyanosis, No use of Accessory Musc,       Skin:  No Blisters, No Cuts\Scrapes, No Acanthosis Nigricans, No DM Dermopathy,     No Fungus, No NLD, No Rash, No Vitiligo      Psychiatric:  Appropriate Affect, Intact Judgement, Oriented x 3,             Impression      Type 2 diabetes controlled, hypertension, hyperlipidemia            Diagnosis      TYPE 2 DIABETES MELLITUS WITHOUT COMPLICATIONS - E11.9            Hypertension - I10            Hyperlipemia - E78.5            Notes      New Diagnostics      * Hemoglobin A1c         Dx: TYPE 2  DIABETES MELLITUS WITHOUT COMPLICATIONS - E11.9            Plan      No changes were made to the patient's treatment plan today.  He will continue to    take the medications as previously prescribed and maintain his current dietary     and physical activity efforts to control glucose levels.  The patient will be     seen for follow-up appointment in 3 months.  He will continue to monitor his     glucose levels 3-4 times a day.  He will be scheduled for an A1c prior to his fo    llow-up appointment.            Pain Plan      Pain Zero Today            Electronically signed by JAN PORRAS  09/04/2020 11:45       Disclaimer: Converted document may not contain table formatting or lab diagrams. Please see [employer] [facility] for the authenticated document.

## 2021-05-28 NOTE — PROGRESS NOTES
Patient: ANJANA WHARTON     Acct: [id number]     Report: #[id number]  UNIT #: [id number]     : 1965    Encounter Date:2020  PRIMARY CARE: NIYAH CHRISTIAN  ***Signed***  --------------------------------------------------------------------------------------------------------------------  Date of Encounter      2020            Chief Complaint      high blood sugar; high A1c            Referring Providers/Copies To      Referring Provider:  NIYAH CHRISTIAN            Medications      Last Reconciled on 20 4:42 pm by JAN PORRAS      SITagliptin (Januvia) 100 Mg Tablet      100 MG PO QDAY, #30 TAB 0 Refills         Reported         20       Lisinopril* (Lisinopril*) 5 Mg Tablet      5 MG PO QDAY, #30 TAB 0 Refills         Reported         20       Canagliflozin (INVOKANA) 300 Mg Tab      300 MG PO QDAY, #30 TAB 0 Refills         Reported         20       Atorvastatin (Atorvastatin) 20 Mg Tablet      20 MG PO HS, #30 TAB 0 Refills         Reported         20       metFORMIN HCl (metFORMIN HCl) 1,000 Mg Tablet      1000 MG PO BID for 30 Days, #60 TAB         Reported         20            Pain Score      Experiencing any pain?:  No            HPI - Diabetes      This patient is seen in the office today for evaluation for diabetes medication     management.  He is a 54-year-old male patient with a history of type 2 diabetes     uncontrolled.  The patient has a 10-year history of type 2 diabetes without     complications.  The patient states his most recent A1c was 9.4% and he has been     trying to do better recently with his diet to control his glucose levels.  He is    currently managed using metformin 1000 mg twice daily, Invokana 300 mg daily and    Januvia 100 mg daily.  The patient states he was previously treated with Farxiga    but it was not effective in controlling his glucose levels.  He denies ever     being treated with any insulin or other injectable  medication.              He states that when he had his A1c drawn his glucose levels were typically     running 180-190 on most occasions however since he has implemented some dietary     changes glucose levels are mostly in the 120-140 range.            Most Recent Lab Findings      Most Recent HGA1C      The most recent A1c was collected on May 23, 2020 and was 9.4% indicating     uncontrolled type 2 diabetes.            Item Value  Date Time             Hemoglobin A1c 9.4 % H 5/23/20 0818            Diabetes Type:  Type 2      Diabetes Complications:  None      Number of Years?:  10      Dietary Habits:  3 Meals daily (eats what he wants; been trying to do better by     watching his carbohydrate intake and snacking), Snacks (He is trying to choose     healthier snacks), Diet Drinks      Exercise:  Regularly (active at work; lifting weights and walking)      BG Monitoring:  Meter      Frequency:  Times per day (maybe once a day or 2-3 times a week)      Blood Glucose Range:        -190's but now better 120's - 140s            PAST,FAMILY,   Past Medical History      Past Medical History:  Hyperlipidemia, Hypertension            Past Surgical History      Past Surgical History:  None            Past Family History      TYPE 2 DM (mother borderline), CV Disease (father), Stroke (mother)            Social History      Marrital Status:        Lives independently:  Yes      Number of Children:  3      Occupation:  supervisor in factory            Tobacco Use      Smoking status:  Never smoker            Alcohol Use      Occassional            Substance Use      Substance Use:  Denies Use            Review of Sytems      General:  No Appetite Changes; Fatigue (some); No Weight Loss, No Weight Gain      Eyes:  Negative for Blurred Vision; Positive for Corrective Lenses; Negative for    Vision Changes      Cardiovascular:  No Chest Pain, No Palpitations      Gastrointestinal:  No Constipation, No Diarrhea, No  Nausea/Vomiting      Integumentary:  No Lesions, No Rash, No Wounds      Neurologic:  No Extremity Pain, No Numbness, No Tingling      Psychiatric:  No Anxiety, No Depression      Endocrine:  No Hypoglycemia, No Hypo Unawareness, No Nocturnal Hypo, No     Polyuria, No Polyphagia; Polydipsia            Exam      General:  Awake and Alert, Appropriately dressed/groomed, No Acute Distress,       Eyes:  Sclera Non-Icteric, EOM Intact, Eyelids without swelling,       Cardiovascular:  No Peripheral Edema, Normal Chest Appearance,       Musculoskeletal:  Steady Gait, Normal Strength, Normal ROM,       Respiratory:  No Respiratory Distress, No Cyanosis, No use of Accessory Musc,       Skin:  No Blisters, No Cuts\Scrapes, No Acanthosis Nigricans, No DM Dermopathy,     No Fungus, No NLD, No Rash, No Vitiligo      Psychiatric:  Appropriate Affect, Intact Judgement, Oriented x 3,             Impression      Type 2 diabetes uncontrolled with hyperlipidemia            Diagnosis      TYPE 2 DIABETES MELLITUS WITH HYPERGLYCEMIA - E11.65            Notes      New Diagnostics      * Hemoglobin A1c, Routine         Dx: TYPE 2 DIABETES MELLITUS WITH HYPERGLYCEMIA - E11.65      * Micro Alb UA H, Routine         Dx: TYPE 2 DIABETES MELLITUS WITH HYPERGLYCEMIA - E11.65            Plan      At this time because the patient reports he has already made dietary changes as     well as increasing physical activity and seen improvement in glucose levels we     will continue to focus on his dietary changes.  We will repeat his A1c in 2     months to see if he has any improvement.  If there is no improvement we will     consider adding a GLP-1 or insulin considering the patient is on 3 oral agents     at this time.  He will be scheduled for follow-up in 2 months.            Pain Plan      Pain Zero Today            Electronically signed by JAN PORRAS  07/05/2020 16:43       Disclaimer: Converted document may not contain table formatting or  lab diagrams. Please see [employer] for the authenticated document.

## 2021-06-04 ENCOUNTER — HOSPITAL ENCOUNTER (OUTPATIENT)
Dept: OTHER | Facility: HOSPITAL | Age: 56
Discharge: HOME | End: 2021-06-04
Attending: NURSE PRACTITIONER

## 2021-06-04 LAB
CONV CREATININE URINE, RANDOM: 73.5 MG/DL (ref 10–300)
CONV MICROALBUM.,U,RANDOM: <12 MG/L (ref 0–20)
EST. AVERAGE GLUCOSE BLD GHB EST-MCNC: 154 MG/DL
HBA1C MFR BLD: 7 % (ref 3.5–5.7)
MICROALBUMIN/CREAT UR: 16.3 MG/G{CRE} (ref 0–25)

## 2021-06-11 ENCOUNTER — OFFICE VISIT (OUTPATIENT)
Dept: DIABETES SERVICES | Facility: CLINIC | Age: 56
End: 2021-06-11

## 2021-06-11 VITALS
OXYGEN SATURATION: 100 % | DIASTOLIC BLOOD PRESSURE: 62 MMHG | BODY MASS INDEX: 25.42 KG/M2 | HEART RATE: 80 BPM | HEIGHT: 71 IN | SYSTOLIC BLOOD PRESSURE: 97 MMHG | TEMPERATURE: 98.4 F | WEIGHT: 181.6 LBS

## 2021-06-11 DIAGNOSIS — E11.9 TYPE 2 DIABETES MELLITUS WITHOUT COMPLICATION, WITHOUT LONG-TERM CURRENT USE OF INSULIN (HCC): Primary | ICD-10-CM

## 2021-06-11 PROCEDURE — 99213 OFFICE O/P EST LOW 20 MIN: CPT | Performed by: NURSE PRACTITIONER

## 2021-06-11 RX ORDER — ATORVASTATIN CALCIUM 20 MG/1
20 TABLET, FILM COATED ORAL DAILY
COMMUNITY
Start: 2021-04-23 | End: 2021-07-17 | Stop reason: SDUPTHER

## 2021-06-11 RX ORDER — SITAGLIPTIN 100 MG/1
100 TABLET, FILM COATED ORAL DAILY
COMMUNITY
Start: 2021-05-19 | End: 2021-09-03

## 2021-06-11 RX ORDER — METFORMIN HYDROCHLORIDE 500 MG/1
1000 TABLET, EXTENDED RELEASE ORAL 2 TIMES DAILY
COMMUNITY
Start: 2021-04-12 | End: 2021-07-16 | Stop reason: SDUPTHER

## 2021-06-11 RX ORDER — LISINOPRIL 5 MG/1
5 TABLET ORAL DAILY
COMMUNITY
Start: 2021-06-04 | End: 2021-07-17 | Stop reason: SDUPTHER

## 2021-06-11 RX ORDER — CANAGLIFLOZIN 300 MG/1
300 TABLET, FILM COATED ORAL DAILY
COMMUNITY
Start: 2021-05-16 | End: 2021-09-15

## 2021-06-11 NOTE — PROGRESS NOTES
Chief Complaint  Diabetes    Referred By: JOESPH Avila    Subjective          Cholo Morejon presents to [facility] for follow-up diabetes medication management    History of Present Illness    Visit type: follow-up  Diabetes type: Type 2  Current diabetes status/concerns/issues: He denies any significant concerns with his diabetes today. He feels like glucose levels have been well controlled.  Diabetes symptoms: none  Diabetes complications: None  Hypoglycemia: None reported  Hypoglycemia Symptoms: does not have hypoglycemia  Diabetes treatment: He is currently managed using Invokana 300 mg once a day, Januvia 100 mg once a day, and Metformin at 1000 mg twice a day  Blood glucose device: Meter  Blood glucose monitoring frequency: 1 - 2  Blood glucose range/average: 140's on most occasions  Diet: Avoids high carb/sweet foods and Diet drinks only  Activity: minimal    Past Medical History:   Diagnosis Date   • Hyperlipidemia    • Hypertension      History reviewed. No pertinent surgical history.  Family History   Problem Relation Age of Onset   • Diabetes type II Other    • Heart disease Other    • Stroke Other    • No Known Problems Sister    • Cancer Brother         non hodgkin's lymphoma   • Diabetes Maternal Grandmother    • Lung cancer Maternal Grandfather    • No Known Problems Paternal Grandmother    • No Known Problems Paternal Grandfather    • Coronary artery disease Mother         stent placement   • Coronary artery disease Father         triple bypass     Social History     Socioeconomic History   • Marital status:      Spouse name: Not on file   • Number of children: Not on file   • Years of education: Not on file   • Highest education level: Not on file   Tobacco Use   • Smoking status: Never Smoker   • Smokeless tobacco: Never Used   Vaping Use   • Vaping Use: Never used   Substance and Sexual Activity   • Alcohol use: Yes     Comment: occasionally   •  "Drug use: Never     No Known Allergies    Current Outpatient Medications:   •  atorvastatin (LIPITOR) 20 MG tablet, Take 20 mg by mouth Daily., Disp: , Rfl:   •  Invokana 300 MG tablet tablet, Take 300 mg by mouth Daily., Disp: , Rfl:   •  Januvia 100 MG tablet, Take 100 mg by mouth Daily., Disp: , Rfl:   •  lisinopril (PRINIVIL,ZESTRIL) 5 MG tablet, Take 5 mg by mouth Daily., Disp: , Rfl:   •  metFORMIN ER (GLUCOPHAGE-XR) 500 MG 24 hr tablet, Take 1,000 mg by mouth 2 (Two) Times a Day., Disp: , Rfl:   •  vitamin D3 125 MCG (5000 UT) capsule capsule, Take 5,000 Units by mouth Daily., Disp: , Rfl:     Review of Systems   Constitutional: Negative for activity change, appetite change, fatigue, fever, unexpected weight gain and unexpected weight loss.   HENT: Negative for congestion, ear pain, facial swelling, hearing loss, sore throat and tinnitus.    Eyes: Negative for blurred vision, double vision, redness and visual disturbance.   Respiratory: Negative for cough, shortness of breath and wheezing.    Cardiovascular: Negative for chest pain, palpitations and leg swelling.   Gastrointestinal: Negative for abdominal distention, constipation, diarrhea, nausea, vomiting, GERD and indigestion.   Endocrine: Negative for polydipsia, polyphagia and polyuria.   Genitourinary: Negative for difficulty urinating, frequency and urgency.   Musculoskeletal: Negative for back pain, gait problem and myalgias.   Skin: Negative for rash, skin lesions and bruise.   Neurological: Negative for seizures, speech difficulty, weakness, headache and confusion.   Psychiatric/Behavioral: Negative for sleep disturbance, depressed mood and stress. The patient is not nervous/anxious.         Objective     Vitals:    06/11/21 1353   BP: 97/62   BP Location: Right arm   Patient Position: Sitting   Cuff Size: Adult   Pulse: 80   Temp: 98.4 °F (36.9 °C)   TempSrc: Oral   SpO2: 100%   Weight: 82.4 kg (181 lb 9.6 oz)   Height: 179.1 cm (70.5\")   PainSc: " 0-No pain     Body mass index is 25.69 kg/m².      Physical Exam  Constitutional:       Appearance: Normal appearance. He is normal weight.   HENT:      Head: Normocephalic and atraumatic.      Right Ear: External ear normal.      Left Ear: External ear normal.      Nose: Nose normal.   Eyes:      Extraocular Movements: Extraocular movements intact.      Conjunctiva/sclera: Conjunctivae normal.   Pulmonary:      Effort: Pulmonary effort is normal.   Abdominal:      General: Abdomen is flat.   Musculoskeletal:         General: Normal range of motion.      Cervical back: Normal range of motion.   Neurological:      General: No focal deficit present.      Mental Status: He is alert and oriented to person, place, and time. Mental status is at baseline.   Psychiatric:         Mood and Affect: Mood normal.         Behavior: Behavior normal.         Thought Content: Thought content normal.         Judgment: Judgment normal.         Result Review :   The following data was reviewed by: ODETTE Yoon on 06/11/2021:        Most Recent A1C    HGBA1C Most Recent 6/4/21   Hemoglobin A1C 7.0 (A)   (A) Abnormal value       Comments are available for some flowsheets but are not being displayed.         his most recent A1c was collected on June 4, 2021 was 7.0% which is down from the prior result of 7.4% in March of this year.    A1C Last 3 Results    HGBA1C Last 3 Results 12/11/20 3/6/21 6/4/21   Hemoglobin A1C 6.8 (A) 7.4 (A) 7.0 (A)   (A) Abnormal value       Comments are available for some flowsheets but are not being displayed.                   Assessment:  Diagnoses and all orders for this visit:    1. Type 2 diabetes mellitus without complication, without long-term current use of insulin (CMS/[facility]) (Primary)  -     Hemoglobin A1c; Future        Plan: No changes were made to his treatment plan as his A1c is currently in target.    The patient will continue to monitor his blood glucose levels 1-2 times each day.  If  he experiences any increased frequency or severity of hypoglycemia, or worsening hyperglycemia, he will contact the office for further instructions.        Follow Up     Return in about 3 months (around 9/11/2021) for Medication Management.    Patient was given instructions and counseling regarding his condition or for health maintenance advice. Please see specific information pulled into the AVS if appropriate.     Kiley Bennett, ODETTE  06/11/2021

## 2021-06-14 ENCOUNTER — OFFICE VISIT (OUTPATIENT)
Dept: FAMILY MEDICINE CLINIC | Age: 56
End: 2021-06-14

## 2021-06-14 VITALS
DIASTOLIC BLOOD PRESSURE: 72 MMHG | BODY MASS INDEX: 25.8 KG/M2 | WEIGHT: 182.4 LBS | HEART RATE: 84 BPM | SYSTOLIC BLOOD PRESSURE: 111 MMHG

## 2021-06-14 DIAGNOSIS — E11.9 DIABETES MELLITUS WITHOUT COMPLICATION (HCC): ICD-10-CM

## 2021-06-14 DIAGNOSIS — E78.49 OTHER HYPERLIPIDEMIA: Primary | ICD-10-CM

## 2021-06-14 DIAGNOSIS — I10 ESSENTIAL HYPERTENSION: ICD-10-CM

## 2021-06-14 PROCEDURE — 99213 OFFICE O/P EST LOW 20 MIN: CPT | Performed by: NURSE PRACTITIONER

## 2021-06-14 NOTE — ASSESSMENT & PLAN NOTE
Reviewed with him his labs, continue efforts with his diet and regular exercise.  Continue Lipitor.  Follow up fasting for labs

## 2021-06-14 NOTE — PROGRESS NOTES
Cholo Morejon presents to [facility].    Chief Complaint:  Follow-up       History of Present Illness:  Follow up for bp and cholesterol.  He sees Kiley Bennett NP for his diabetes and his improved his A1C, gotten his urine checked, but not had routine labs.  Has had his first COVID vaccine.  PMH not changed since I last saw him.  He will be off work the week before July 4 and can come back fasting for labs.  He feels better now that his diabetes is under better control.  Sugars running fasting less than 140 .  Does not check his BP.        Review of Systems:  Review of Systems   Constitutional: Negative for fatigue and fever.   Respiratory: Negative for cough and shortness of breath.    Cardiovascular: Negative for chest pain, palpitations and leg swelling.   Neurological: Negative for numbness.       Medical History:  Past Medical History:   • Hyperlipidemia   • Hypertension     No past surgical history on file.   Family History   Problem Relation Age of Onset   • Diabetes type II Other    • Heart disease Other    • Stroke Other    • No Known Problems Sister    • Cancer Brother         non hodgkin's lymphoma   • Diabetes Maternal Grandmother    • Lung cancer Maternal Grandfather    • No Known Problems Paternal Grandmother    • No Known Problems Paternal Grandfather    • Coronary artery disease Mother         stent placement   • Coronary artery disease Father         triple bypass     Social History     Tobacco Use   • Smoking status: Never Smoker   • Smokeless tobacco: Never Used   Substance Use Topics   • Alcohol use: Yes     Comment: occasionally       Health Maintenance Due   Topic Date Due   • COLORECTAL CANCER SCREENING  Never done   • ANNUAL PHYSICAL  Never done   • Pneumococcal Vaccine 0-64 (1 of 1 - PPSV23) Never done   • Hepatitis B (1 of 3 - Risk 3-dose series) Never done   • TDAP/TD VACCINES (1 - Tdap) Never done   • ZOSTER VACCINE (1 of 2) Never done   • HEPATITIS C  SCREENING  Never done   • DIABETIC FOOT EXAM  Never done   • DIABETIC EYE EXAM  Never done   • LIPID PANEL  Never done   • COVID-19 Vaccine (2 - Pfizer 2-dose series) 06/18/2021        Immunization History   Administered Date(s) Administered   • COVID-19 (PFIZER) 05/28/2021       No Known Allergies     Medications:  Current Outpatient Medications on File Prior to Visit   Medication Sig   • atorvastatin (LIPITOR) 20 MG tablet Take 20 mg by mouth Daily.   • Invokana 300 MG tablet tablet Take 300 mg by mouth Daily.   • Januvia 100 MG tablet Take 100 mg by mouth Daily.   • lisinopril (PRINIVIL,ZESTRIL) 5 MG tablet Take 5 mg by mouth Daily.   • metFORMIN ER (GLUCOPHAGE-XR) 500 MG 24 hr tablet Take 1,000 mg by mouth 2 (Two) Times a Day.   • vitamin D3 125 MCG (5000 UT) capsule capsule Take 5,000 Units by mouth Daily.     No current facility-administered medications on file prior to visit.       Vital Signs:   /72 (BP Location: Right arm, Patient Position: Sitting, Cuff Size: Adult)   Pulse 84   Wt 82.7 kg (182 lb 6.4 oz)   BMI 25.80 kg/m²       Physical Exam:  Physical Exam  Vitals reviewed.   Constitutional:       General: He is not in acute distress.     Appearance: Normal appearance.   Neck:      Vascular: No carotid bruit.   Cardiovascular:      Rate and Rhythm: Normal rate and regular rhythm.      Heart sounds: Normal heart sounds. No murmur heard.     Pulmonary:      Effort: Pulmonary effort is normal. No respiratory distress.      Breath sounds: Normal breath sounds.   Musculoskeletal:      Right lower leg: No edema.      Left lower leg: No edema.   Neurological:      Mental Status: He is alert.   Psychiatric:         Mood and Affect: Mood normal.         Behavior: Behavior normal.         Result Review      The following data was reviewed by: ODETTE Avila on 06/14/2021:reviewed labs from over the last year     Lab Results   Component Value Date    HGBA1C 7.0 (H) 06/04/2021                Assessment and Plan:          Diagnoses and all orders for this visit:    1. Other hyperlipidemia (Primary)  Assessment & Plan:  Reviewed with him his labs, continue efforts with his diet and regular exercise.  Continue Lipitor.  Follow up fasting for labs     Orders:  -     Comprehensive Metabolic Panel  -     Lipid Panel    2. Essential hypertension  Assessment & Plan:  Continue ACE, monitor bp       3. Diabetes mellitus without complication (CMS/HCC)  Assessment & Plan:  Follow up with NIKHIL Bennett as directed         Follow Up   Return for followup pending lab results.  Patient was given instructions and counseling regarding his condition or for health maintenance advice. Please see specific information pulled into the AVS if appropriate.

## 2021-07-01 ENCOUNTER — LAB (OUTPATIENT)
Dept: LAB | Facility: HOSPITAL | Age: 56
End: 2021-07-01

## 2021-07-01 VITALS
HEART RATE: 73 BPM | WEIGHT: 190.8 LBS | HEIGHT: 69 IN | TEMPERATURE: 97.4 F | BODY MASS INDEX: 28.26 KG/M2 | SYSTOLIC BLOOD PRESSURE: 139 MMHG | DIASTOLIC BLOOD PRESSURE: 80 MMHG

## 2021-07-01 VITALS
HEIGHT: 69 IN | BODY MASS INDEX: 28.44 KG/M2 | TEMPERATURE: 98.7 F | DIASTOLIC BLOOD PRESSURE: 70 MMHG | WEIGHT: 192 LBS | SYSTOLIC BLOOD PRESSURE: 128 MMHG | HEART RATE: 81 BPM

## 2021-07-01 VITALS
DIASTOLIC BLOOD PRESSURE: 78 MMHG | HEIGHT: 69 IN | TEMPERATURE: 98.6 F | SYSTOLIC BLOOD PRESSURE: 136 MMHG | HEART RATE: 79 BPM | BODY MASS INDEX: 27.67 KG/M2 | WEIGHT: 186.8 LBS

## 2021-07-01 VITALS
BODY MASS INDEX: 28.23 KG/M2 | HEART RATE: 80 BPM | TEMPERATURE: 98 F | SYSTOLIC BLOOD PRESSURE: 123 MMHG | DIASTOLIC BLOOD PRESSURE: 72 MMHG | HEIGHT: 69 IN | WEIGHT: 190.6 LBS

## 2021-07-01 LAB
ALBUMIN SERPL-MCNC: 4.8 G/DL (ref 3.5–5.2)
ALBUMIN/GLOB SERPL: 1.8 G/DL
ALP SERPL-CCNC: 83 U/L (ref 39–117)
ALT SERPL W P-5'-P-CCNC: 30 U/L (ref 1–41)
ANION GAP SERPL CALCULATED.3IONS-SCNC: 8.7 MMOL/L (ref 5–15)
AST SERPL-CCNC: 17 U/L (ref 1–40)
BILIRUB SERPL-MCNC: 0.6 MG/DL (ref 0–1.2)
BUN SERPL-MCNC: 14 MG/DL (ref 6–20)
BUN/CREAT SERPL: 15.6 (ref 7–25)
CALCIUM SPEC-SCNC: 9.5 MG/DL (ref 8.6–10.5)
CHLORIDE SERPL-SCNC: 101 MMOL/L (ref 98–107)
CHOLEST SERPL-MCNC: 159 MG/DL (ref 0–200)
CO2 SERPL-SCNC: 27.3 MMOL/L (ref 22–29)
CREAT SERPL-MCNC: 0.9 MG/DL (ref 0.76–1.27)
GFR SERPL CREATININE-BSD FRML MDRD: 88 ML/MIN/1.73
GLOBULIN UR ELPH-MCNC: 2.6 GM/DL
GLUCOSE SERPL-MCNC: 144 MG/DL (ref 65–99)
HDLC SERPL-MCNC: 44 MG/DL (ref 40–60)
LDLC SERPL CALC-MCNC: 102 MG/DL (ref 0–100)
LDLC/HDLC SERPL: 2.31 {RATIO}
POTASSIUM SERPL-SCNC: 5 MMOL/L (ref 3.5–5.2)
PROT SERPL-MCNC: 7.4 G/DL (ref 6–8.5)
SODIUM SERPL-SCNC: 137 MMOL/L (ref 136–145)
TRIGL SERPL-MCNC: 66 MG/DL (ref 0–150)
VLDLC SERPL-MCNC: 13 MG/DL (ref 5–40)

## 2021-07-01 PROCEDURE — 80053 COMPREHEN METABOLIC PANEL: CPT | Performed by: NURSE PRACTITIONER

## 2021-07-01 PROCEDURE — 80061 LIPID PANEL: CPT | Performed by: NURSE PRACTITIONER

## 2021-07-01 PROCEDURE — 36415 COLL VENOUS BLD VENIPUNCTURE: CPT | Performed by: NURSE PRACTITIONER

## 2021-07-02 VITALS
HEART RATE: 80 BPM | TEMPERATURE: 97.9 F | SYSTOLIC BLOOD PRESSURE: 115 MMHG | BODY MASS INDEX: 26.84 KG/M2 | WEIGHT: 181.2 LBS | DIASTOLIC BLOOD PRESSURE: 79 MMHG | HEIGHT: 69 IN

## 2021-07-02 VITALS
DIASTOLIC BLOOD PRESSURE: 87 MMHG | SYSTOLIC BLOOD PRESSURE: 123 MMHG | HEIGHT: 69 IN | TEMPERATURE: 97.9 F | WEIGHT: 192.2 LBS | BODY MASS INDEX: 28.47 KG/M2 | HEART RATE: 90 BPM

## 2021-07-14 ENCOUNTER — DOCUMENTATION (OUTPATIENT)
Dept: DIABETES SERVICES | Facility: HOSPITAL | Age: 56
End: 2021-07-14

## 2021-07-14 NOTE — TELEPHONE ENCOUNTER
Pt left  requesting refill for Metformin.  [facility]  Pt call back # [phone]

## 2021-07-16 RX ORDER — METFORMIN HYDROCHLORIDE 500 MG/1
TABLET, EXTENDED RELEASE ORAL
Qty: 360 TABLET | Refills: 0 | Status: SHIPPED | OUTPATIENT
Start: 2021-07-16 | End: 2021-10-14

## 2021-07-16 NOTE — TELEPHONE ENCOUNTER
LAST OV: 6/14/21  NEXT OV: None with us- Dr. Bennett on 9/10/21  LAST LAB: 7/1/21 LIPID/CMP 6/11/21 A1c FUTURE  PLEASE SIGN OR ADVISE    Lab Results   Component Value Date    GLUCOSE 144 (H) 07/01/2021    BUN 14 07/01/2021    CREATININE 0.90 07/01/2021    EGFRIFNONA 88 07/01/2021    BCR 15.6 07/01/2021    K 5.0 07/01/2021    CO2 27.3 07/01/2021    CALCIUM 9.5 07/01/2021    ALBUMIN 4.80 07/01/2021    LABIL2 1.8 05/23/2020    AST 17 07/01/2021    ALT 30 07/01/2021     Lipid Panel    Lipid Panel 7/1/21   Total Cholesterol 159   Triglycerides 66   HDL Cholesterol 44   VLDL Cholesterol 13   LDL Cholesterol  102 (A)   LDL/HDL Ratio 2.31   (A) Abnormal value            Contains abnormal data Hemoglobin A1C With EAG  Order: [id number]  Status:  Final result   Visible to patient:  No (not released) Next appt:  09/10/2021 at 02:30 PM in Diabetes Services (Kiley Bennett, ODETTE)       Component   Ref Range & Units 1 mo ago   (6/4/21) 4 mo ago   (3/6/21) 7 mo ago   (12/11/20) 10 mo ago   (8/29/20) 1 yr ago   (5/23/20) 1 yr ago   (8/29/19)   Mean Bld Glu Estim.   mg/dL 154  166  148  151  223  223    Hemoglobin A1C   3.5 - 5.7 % 7.0High   7.4High  CM  6.8High  CM  6.9High  CM  9.4High  CM  9.4High  CM    Comment: **Interpretation**   <7% in Controlled Diabetic Patients.   Assay Range 3.4-18.2%   ADA 2010 Standards of Medical Care in Diabetes suggest using   a cut point of >= 6.5% for diagnosis of diabetes and an A1C   range of 5.7%-6.4% as a category of increased risk for future   diabetes.          Specimen Collected: 06/04/21 09:25 Last Resulted: 06/04/21 13:45

## 2021-07-17 RX ORDER — LISINOPRIL 5 MG/1
TABLET ORAL
Qty: 90 TABLET | Refills: 0 | Status: SHIPPED | OUTPATIENT
Start: 2021-07-17 | End: 2021-07-23

## 2021-07-17 RX ORDER — ATORVASTATIN CALCIUM 20 MG/1
TABLET, FILM COATED ORAL
Qty: 90 TABLET | Refills: 0 | Status: SHIPPED | OUTPATIENT
Start: 2021-07-17 | End: 2021-07-23

## 2021-07-22 NOTE — TELEPHONE ENCOUNTER
LAST OV: 6/14/21  NEXT OV: None  LAST LAB: 7/1/21    PLEASE SIGN OR ADVISE    Lipid Panel    Lipid Panel 7/1/21   Total Cholesterol 159   Triglycerides 66   HDL Cholesterol 44   VLDL Cholesterol 13   LDL Cholesterol  102 (A)   LDL/HDL Ratio 2.31   (A) Abnormal value            Lab Results   Component Value Date    GLUCOSE 144 (H) 07/01/2021    BUN 14 07/01/2021    CREATININE 0.90 07/01/2021    EGFRIFNONA 88 07/01/2021    BCR 15.6 07/01/2021    K 5.0 07/01/2021    CO2 27.3 07/01/2021    CALCIUM 9.5 07/01/2021    ALBUMIN 4.80 07/01/2021    LABIL2 1.8 05/23/2020    AST 17 07/01/2021    ALT 30 07/01/2021

## 2021-07-23 RX ORDER — LISINOPRIL 5 MG/1
TABLET ORAL
Qty: 90 TABLET | Refills: 0 | Status: SHIPPED | OUTPATIENT
Start: 2021-07-23 | End: 2021-12-16

## 2021-07-23 RX ORDER — ATORVASTATIN CALCIUM 20 MG/1
TABLET, FILM COATED ORAL
Qty: 90 TABLET | Refills: 0 | Status: SHIPPED | OUTPATIENT
Start: 2021-07-23 | End: 2021-10-11

## 2021-08-31 DIAGNOSIS — E11.9 DIABETES MELLITUS WITHOUT COMPLICATION (HCC): Primary | ICD-10-CM

## 2021-09-02 RX ORDER — SITAGLIPTIN 100 MG/1
100 TABLET, FILM COATED ORAL DAILY
Status: CANCELLED | OUTPATIENT
Start: 2021-09-02

## 2021-09-02 NOTE — TELEPHONE ENCOUNTER
Caller: Cholo Morejon    Relationship: Self    Best call back number: [phone]    Medication needed:   Requested Prescriptions     Pending Prescriptions Disp Refills   • Januvia 100 MG tablet       Sig: Take 1 tablet by mouth Daily.       When do you need the refill by: 09/02/2021    Does the patient have less than a 3 day supply:  [x] Yes  [] No    What is the patient's preferred pharmacy: [facility] [address] [place] [address], [place] [address]  - [phone] [facility] [phone] FX

## 2021-09-03 NOTE — TELEPHONE ENCOUNTER
Rx Refill Note  Requested Prescriptions     Signed Prescriptions Disp Refills   • SITagliptin (Januvia) 100 MG tablet 90 tablet 1     Sig: Take 1 tablet by mouth Daily.     Authorizing Provider: NIYAH CHRISTIAN     Ordering User: LG LUND      Last office visit with prescribing clinician: 6/14/2021      Next office visit with prescribing clinician: none  Last fill: 05/19/21, #90   Lab: Comprehensive Metabolic Panel (07/01/2021 09:40)Hemoglobin A1c (06/11/2021 14:13)      Velma Lund LPN  09/03/21, 15:21 EDT

## 2021-09-07 ENCOUNTER — LAB (OUTPATIENT)
Dept: LAB | Facility: HOSPITAL | Age: 56
End: 2021-09-07

## 2021-09-07 DIAGNOSIS — E11.9 TYPE 2 DIABETES MELLITUS WITHOUT COMPLICATION, WITHOUT LONG-TERM CURRENT USE OF INSULIN (HCC): ICD-10-CM

## 2021-09-07 LAB — HBA1C MFR BLD: 7.91 % (ref 4.8–5.6)

## 2021-09-07 PROCEDURE — 83036 HEMOGLOBIN GLYCOSYLATED A1C: CPT

## 2021-09-07 PROCEDURE — 36415 COLL VENOUS BLD VENIPUNCTURE: CPT

## 2021-09-10 ENCOUNTER — OFFICE VISIT (OUTPATIENT)
Dept: DIABETES SERVICES | Facility: CLINIC | Age: 56
End: 2021-09-10

## 2021-09-10 VITALS
DIASTOLIC BLOOD PRESSURE: 69 MMHG | HEART RATE: 97 BPM | HEIGHT: 70 IN | WEIGHT: 181.6 LBS | SYSTOLIC BLOOD PRESSURE: 128 MMHG | OXYGEN SATURATION: 99 % | BODY MASS INDEX: 26 KG/M2

## 2021-09-10 DIAGNOSIS — E11.9 DIABETES MELLITUS WITHOUT COMPLICATION (HCC): Primary | ICD-10-CM

## 2021-09-10 DIAGNOSIS — E66.3 OVERWEIGHT (BMI 25.0-29.9): ICD-10-CM

## 2021-09-10 PROCEDURE — 99214 OFFICE O/P EST MOD 30 MIN: CPT | Performed by: NURSE PRACTITIONER

## 2021-09-10 RX ORDER — DULAGLUTIDE 0.75 MG/.5ML
0.75 INJECTION, SOLUTION SUBCUTANEOUS
Qty: 4 PEN | Refills: 2 | Status: SHIPPED | OUTPATIENT
Start: 2021-09-10 | End: 2021-10-02

## 2021-09-10 NOTE — PROGRESS NOTES
Chief Complaint  Diabetes (MED REFILLS, NO OTHER CONCERNS AT THIS TIME )    Referred By: No ref. provider found    Subjective          Cholo Morejon presents to [facility] for diabetes medication management    History of Present Illness    Visit type:  follow-up  Diabetes type:  Type 2  Current diabetes status/concerns/issues: He denies any concerns regarding his diabetes at this time  Other health concerns: He denies any new health conditions since last being seen  Diabetes symptoms:  denies polydipsia, polyuria, polyphagia, blurred vision, or excessive fatigue  Diabetes complications:  No history of diabetic neuropathy, retinopathy, nephropathy, gastroparesis, cardiovascular disease, or non-healing wounds  Hypoglycemia:  None reported at this time  Hypoglycemia Symptoms:  No hypoglycemia at this time  Current diabetes treatment: She is currently taking Januvia 100 mg every day, Invokana 300 mg once a day, and Metformin 1000 mg twice a day  Blood glucose device:  Meter  Blood glucose monitoring frequency:  1  Blood glucose range/average: She reports glucose levels between 130 and 165  Diet:  not eating the right foods recently; she has been snacking more than usual  Activity:  Walking    Past Medical History:   Diagnosis Date   • Hyperlipidemia    • Hypertension    • Type 2 diabetes mellitus (CMS/HCC)      History reviewed. No pertinent surgical history.  Family History   Problem Relation Age of Onset   • Diabetes type II Other    • Heart disease Other    • Stroke Other    • No Known Problems Sister    • Cancer Brother         non hodgkin's lymphoma   • Diabetes Maternal Grandmother    • Lung cancer Maternal Grandfather    • No Known Problems Paternal Grandmother    • No Known Problems Paternal Grandfather    • Coronary artery disease Mother         stent placement   • Coronary artery disease Father         triple bypass     Social History     Socioeconomic History   • Marital  status:      Spouse name: Not on file   • Number of children: Not on file   • Years of education: Not on file   • Highest education level: Not on file   Tobacco Use   • Smoking status: Never Smoker   • Smokeless tobacco: Never Used   Vaping Use   • Vaping Use: Never used   Substance and Sexual Activity   • Alcohol use: Yes     Comment: occasionally   • Drug use: Never   • Sexual activity: Defer     No Known Allergies    Current Outpatient Medications:   •  atorvastatin (LIPITOR) 20 MG tablet, TAKE 1 TABLET BY MOUTH EVERY DAY, Disp: 90 tablet, Rfl: 0  •  lisinopril (PRINIVIL,ZESTRIL) 5 MG tablet, TAKE 1 TABLET BY MOUTH EVERY DAY, Disp: 90 tablet, Rfl: 0  •  metFORMIN ER (GLUCOPHAGE-XR) 500 MG 24 hr tablet, TAKE 2 TABLETS BY MOUTH TWICE DAILY, Disp: 360 tablet, Rfl: 0  •  SITagliptin (Januvia) 100 MG tablet, Take 1 tablet by mouth Daily., Disp: 90 tablet, Rfl: 1  •  vitamin D3 125 MCG (5000 UT) capsule capsule, Take 5,000 Units by mouth Daily., Disp: , Rfl:   •  Dulaglutide (Trulicity) 0.75 MG/0.5ML solution pen-injector, Inject 0.75 mg under the skin into the appropriate area as directed Every 7 (Seven) Days for 4 doses., Disp: 4 pen, Rfl: 2  •  Invokana 300 MG tablet tablet, TAKE ONE TABLET BY MOUTH EVERY MORNING BEFORE BREAKFAST, Disp: 30 tablet, Rfl: 3    Review of Systems   Constitutional: Negative for activity change, appetite change, diaphoresis, fatigue, fever, unexpected weight gain and unexpected weight loss.   HENT: Negative for congestion, facial swelling, hearing loss, sore throat and tinnitus.    Eyes: Negative for blurred vision, redness and visual disturbance.   Respiratory: Negative for cough, shortness of breath and wheezing.    Cardiovascular: Negative for chest pain, palpitations and leg swelling.   Gastrointestinal: Negative for abdominal distention, constipation, diarrhea, nausea, vomiting, GERD and indigestion.   Endocrine: Negative for polydipsia, polyphagia and polyuria.  "  Genitourinary: Negative for difficulty urinating, frequency, erectile dysfunction and urgency.   Musculoskeletal: Negative for back pain, gait problem and neck pain.   Skin: Negative for rash, skin lesions and bruise.   Neurological: Negative for dizziness, weakness, numbness, headache, memory problem and confusion.   Psychiatric/Behavioral: Negative for sleep disturbance, depressed mood and stress. The patient is not nervous/anxious.         Objective     Vitals:    09/10/21 1434   BP: 128/69   BP Location: Right arm   Patient Position: Sitting   Cuff Size: Adult   Pulse: 97   SpO2: 99%   Weight: 82.4 kg (181 lb 9.6 oz)   Height: 177.8 cm (70\")   PainSc: 0-No pain     Body mass index is 26.06 kg/m².      Physical Exam  Constitutional:       Appearance: Normal appearance.      Comments: Overweight with BMI of 26.06   HENT:      Head: Normocephalic and atraumatic.      Right Ear: External ear normal.      Left Ear: External ear normal.      Nose: Nose normal.   Eyes:      Extraocular Movements: Extraocular movements intact.      Conjunctiva/sclera: Conjunctivae normal.   Pulmonary:      Effort: Pulmonary effort is normal.   Musculoskeletal:         General: Normal range of motion.      Cervical back: Normal range of motion.   Skin:     General: Skin is warm and dry.   Neurological:      General: No focal deficit present.      Mental Status: He is alert and oriented to person, place, and time. Mental status is at baseline.   Psychiatric:         Mood and Affect: Mood normal.         Behavior: Behavior normal.         Thought Content: Thought content normal.         Judgment: Judgment normal.         Result Review :   The following data was reviewed by: ODETTE Yoon on 09/10/2021:        Her most recent A1c was collected on 9/7/2021 and was 7.91% indicating uncontrolled type 2 diabetes.  This is up from 7% collected in June of this year.    Most Recent A1C    HGBA1C Most Recent 9/7/21   Hemoglobin A1C " 7.91 (A)   (A) Abnormal value              A1C Last 3 Results    HGBA1C Last 3 Results 3/6/21 6/4/21 9/7/21   Hemoglobin A1C 7.4 (A) 7.0 (A) 7.91 (A)   (A) Abnormal value       Comments are available for some flowsheets but are not being displayed.              Assessment:  Diagnoses and all orders for this visit:    1. Diabetes mellitus without complication (CMS/HCC) (Primary)    2. Overweight (BMI 25.0-29.9)    Other orders  -     Dulaglutide (Trulicity) 0.75 MG/0.5ML solution pen-injector; Inject 0.75 mg under the skin into the appropriate area as directed Every 7 (Seven) Days for 4 doses.  Dispense: 4 pen; Refill: 2        Plan: We will add Trulicity to the current treatment plan.  We will start with a starting dose of 0.75 mg once weekly and will increase to 1.5 mg once weekly if the patient tolerates the medication without difficulty.    The patient will monitor his blood glucose levels 1-2 times each day.  If he develops hypoglycemia or experiences any increased frequency or severity of hypoglycemia, he will contact the office for further instructions.          Follow Up     Return in about 6 weeks (around 10/22/2021) for Medication Management.    Patient was given instructions and counseling regarding his condition or for health maintenance advice. Please see specific information pulled into the AVS if appropriate.     Kiley Bennett, ODETTE  09/10/2021

## 2021-09-15 RX ORDER — CANAGLIFLOZIN 300 MG/1
TABLET, FILM COATED ORAL
Qty: 30 TABLET | Refills: 3 | Status: SHIPPED | OUTPATIENT
Start: 2021-09-15 | End: 2022-01-07

## 2021-10-11 RX ORDER — ATORVASTATIN CALCIUM 20 MG/1
TABLET, FILM COATED ORAL
Qty: 90 TABLET | Refills: 0 | Status: SHIPPED | OUTPATIENT
Start: 2021-10-11 | End: 2022-02-01

## 2021-10-14 RX ORDER — METFORMIN HYDROCHLORIDE 500 MG/1
TABLET, EXTENDED RELEASE ORAL
Qty: 360 TABLET | Refills: 0 | Status: SHIPPED | OUTPATIENT
Start: 2021-10-14 | End: 2022-01-19

## 2021-10-22 ENCOUNTER — OFFICE VISIT (OUTPATIENT)
Dept: DIABETES SERVICES | Facility: CLINIC | Age: 56
End: 2021-10-22

## 2021-10-22 VITALS
WEIGHT: 183.64 LBS | DIASTOLIC BLOOD PRESSURE: 73 MMHG | HEIGHT: 71 IN | HEART RATE: 88 BPM | OXYGEN SATURATION: 98 % | BODY MASS INDEX: 25.71 KG/M2 | TEMPERATURE: 97.3 F | SYSTOLIC BLOOD PRESSURE: 112 MMHG

## 2021-10-22 DIAGNOSIS — E11.65 UNCONTROLLED TYPE 2 DIABETES MELLITUS WITH HYPERGLYCEMIA (HCC): Primary | ICD-10-CM

## 2021-10-22 PROCEDURE — 99214 OFFICE O/P EST MOD 30 MIN: CPT | Performed by: NURSE PRACTITIONER

## 2021-10-22 RX ORDER — DULAGLUTIDE 1.5 MG/.5ML
1.5 INJECTION, SOLUTION SUBCUTANEOUS
Qty: 2 ML | Refills: 5 | Status: SHIPPED | OUTPATIENT
Start: 2021-10-22 | End: 2021-11-13

## 2021-10-22 RX ORDER — DULAGLUTIDE 0.75 MG/.5ML
INJECTION, SOLUTION SUBCUTANEOUS
COMMUNITY
End: 2021-10-22

## 2021-12-16 RX ORDER — LISINOPRIL 5 MG/1
TABLET ORAL
Qty: 30 TABLET | Refills: 0 | Status: SHIPPED | OUTPATIENT
Start: 2021-12-16 | End: 2022-01-19

## 2022-01-06 ENCOUNTER — TELEPHONE (OUTPATIENT)
Dept: DIABETES SERVICES | Facility: HOSPITAL | Age: 57
End: 2022-01-06

## 2022-01-06 NOTE — TELEPHONE ENCOUNTER
Pt called and left voice mail, I called him back and he wanted you to know his insurance will no longer pay for his INOKANA. Pt will need you to prescribe something new for him. When we talked about this you asked me to list the Farxiga and Jardiance. I called pt back and let him know that you are taking care of this. Pt reported that he took Farxiga years ago and it didn't really work. Not sure if you want him to try that again. I told pt thank you for letting us kow and that you will take care of this and if there is anything else he needed to please call us here at the office. Pt understood and said thank you

## 2022-01-07 NOTE — TELEPHONE ENCOUNTER
Prescription for Jardiance 25 mg was sent to his pharmacy to replace the previously prescribed Invokana

## 2022-01-19 RX ORDER — LISINOPRIL 5 MG/1
TABLET ORAL
Qty: 30 TABLET | Refills: 0 | Status: SHIPPED | OUTPATIENT
Start: 2022-01-19 | End: 2022-02-17

## 2022-01-19 RX ORDER — METFORMIN HYDROCHLORIDE 500 MG/1
TABLET, EXTENDED RELEASE ORAL
Qty: 360 TABLET | Refills: 0 | Status: SHIPPED | OUTPATIENT
Start: 2022-01-19 | End: 2022-04-25 | Stop reason: SDUPTHER

## 2022-02-01 RX ORDER — ATORVASTATIN CALCIUM 20 MG/1
TABLET, FILM COATED ORAL
Qty: 90 TABLET | Refills: 0 | Status: SHIPPED | OUTPATIENT
Start: 2022-02-01 | End: 2022-05-11

## 2022-02-11 ENCOUNTER — OFFICE VISIT (OUTPATIENT)
Dept: DIABETES SERVICES | Facility: CLINIC | Age: 57
End: 2022-02-11

## 2022-02-11 VITALS
RESPIRATION RATE: 18 BRPM | HEIGHT: 70 IN | HEART RATE: 79 BPM | OXYGEN SATURATION: 99 % | TEMPERATURE: 98.2 F | SYSTOLIC BLOOD PRESSURE: 113 MMHG | DIASTOLIC BLOOD PRESSURE: 67 MMHG | BODY MASS INDEX: 26.37 KG/M2 | WEIGHT: 184.2 LBS

## 2022-02-11 DIAGNOSIS — E11.9 CONTROLLED TYPE 2 DIABETES MELLITUS WITHOUT COMPLICATION, WITHOUT LONG-TERM CURRENT USE OF INSULIN: Primary | ICD-10-CM

## 2022-02-11 LAB
EXPIRATION DATE: ABNORMAL
HBA1C MFR BLD: 6.9 %
Lab: ABNORMAL

## 2022-02-11 PROCEDURE — 99213 OFFICE O/P EST LOW 20 MIN: CPT | Performed by: NURSE PRACTITIONER

## 2022-02-11 RX ORDER — DULAGLUTIDE 1.5 MG/.5ML
INJECTION, SOLUTION SUBCUTANEOUS
COMMUNITY
Start: 2022-01-31 | End: 2022-04-20

## 2022-02-11 NOTE — PROGRESS NOTES
Chief Complaint  Diabetes (no real changes since last visit,  blood sugars are up and down depending on what he eats)    Referred By: No ref. provider found    Subjective          Cholo Morejon presents to [facility] for diabetes medication management    History of Present Illness    Visit type:  follow-up  Diabetes type:  Type 2  Current diabetes status/concerns/issues: He denies any concerns regarding his diabetes today.  Other health concerns: He denies have any new health issues other than having a basal cell skin cancer removed from the right side of his cheek  Diabetes symptoms:    Polyuria: No   Polydipsia: No   Polyphagia: No   Blurred vision: No   Excessive fatigue: No  Diabetes complications:  Neuropathy:No  Nephropathy:No  Retinopathy:No  Amputation/Wounds:No  Gastroparesis:No  Cardiovascular Disease:Yes, Hypertension  Erectile Dysfunction:No  Hypoglycemia:  None reported at this time  Hypoglycemia Symptoms:  No hypoglycemia at this time  Current diabetes treatment:   Jardiance 25 mg once a day, Metformin 1000 mg twice a day and Trulicity 1.5 mg once weekly   Blood glucose device:  Meter  Blood glucose monitoring frequency:  1  Blood glucose range/average: He reports glucose levels ranging between 125 and 145  Diet:  Avoids high carb/sweet foods, Diet drinks only  Activity/Exercise:  He is physically active with work    Past Medical History:   Diagnosis Date   • Hx of skin cancer, basal cell    • Hyperlipidemia    • Hypertension    • Type 2 diabetes mellitus (HCC)      Past Surgical History:   Procedure Laterality Date   • SKIN CANCER EXCISION       Family History   Problem Relation Age of Onset   • Diabetes type II Other    • Heart disease Other    • Stroke Other    • No Known Problems Sister    • Cancer Brother         non hodgkin's lymphoma   • Diabetes Maternal Grandmother    • Lung cancer Maternal Grandfather    • No Known Problems Paternal Grandmother    • No Known  Problems Paternal Grandfather    • Coronary artery disease Mother         stent placement   • Coronary artery disease Father         triple bypass     Social History     Socioeconomic History   • Marital status:    Tobacco Use   • Smoking status: Never Smoker   • Smokeless tobacco: Never Used   Vaping Use   • Vaping Use: Never used   Substance and Sexual Activity   • Alcohol use: Yes     Comment: occasionally   • Drug use: Never   • Sexual activity: Defer     No Known Allergies    Current Outpatient Medications:   •  atorvastatin (LIPITOR) 20 MG tablet, TAKE 1 TABLET BY MOUTH EVERY DAY, Disp: 90 tablet, Rfl: 0  •  empagliflozin (Jardiance) 25 MG tablet tablet, Take 25 mg by mouth Daily., Disp: , Rfl:   •  lisinopril (PRINIVIL,ZESTRIL) 5 MG tablet, TAKE 1 TABLET BY MOUTH EVERY DAY, Disp: 30 tablet, Rfl: 0  •  metFORMIN ER (GLUCOPHAGE-XR) 500 MG 24 hr tablet, TAKE 2 TABLETS BY MOUTH TWICE DAILY, Disp: 360 tablet, Rfl: 0  •  Trulicity 1.5 MG/0.5ML solution pen-injector, , Disp: , Rfl:   •  vitamin D3 125 MCG (5000 UT) capsule capsule, Take 5,000 Units by mouth Daily., Disp: , Rfl:     Review of Systems   Constitutional: Negative for activity change, appetite change, fatigue, fever, unexpected weight gain and unexpected weight loss.   HENT: Negative for congestion, ear pain, facial swelling, hearing loss, sore throat and tinnitus.    Eyes: Negative for blurred vision, double vision, redness and visual disturbance.   Respiratory: Negative for cough, shortness of breath and wheezing.    Cardiovascular: Negative for chest pain, palpitations and leg swelling.   Gastrointestinal: Negative for abdominal distention, constipation, diarrhea, nausea, vomiting, GERD and indigestion.   Endocrine: Negative for polydipsia, polyphagia and polyuria.   Genitourinary: Negative for difficulty urinating, frequency and urgency.   Musculoskeletal: Negative for back pain, gait problem and myalgias.   Skin: Negative for rash, skin  "lesions and wound.   Neurological: Negative for seizures, speech difficulty, weakness, headache and confusion.   Psychiatric/Behavioral: Negative for sleep disturbance, depressed mood and stress. The patient is not nervous/anxious.         Objective     Vitals:    02/11/22 1524   BP: 113/67   BP Location: Right arm   Patient Position: Sitting   Cuff Size: Adult   Pulse: 79   Resp: 18   Temp: 98.2 °F (36.8 °C)   SpO2: 99%   Weight: 83.6 kg (184 lb 3.2 oz)   Height: 177.8 cm (70\")   PainSc: 0-No pain     Body mass index is 26.43 kg/m².    Physical Exam  Constitutional:       Appearance: Normal appearance.      Comments: Overweight with BMI of 26.43   HENT:      Head: Normocephalic and atraumatic.      Right Ear: External ear normal.      Left Ear: External ear normal.      Nose: Nose normal.   Eyes:      Extraocular Movements: Extraocular movements intact.      Conjunctiva/sclera: Conjunctivae normal.   Pulmonary:      Effort: Pulmonary effort is normal.   Musculoskeletal:         General: Normal range of motion.      Cervical back: Normal range of motion.   Skin:     General: Skin is warm and dry.   Neurological:      General: No focal deficit present.      Mental Status: He is alert and oriented to person, place, and time. Mental status is at baseline.   Psychiatric:         Mood and Affect: Mood normal.         Behavior: Behavior normal.         Thought Content: Thought content normal.         Judgment: Judgment normal.         Result Review :   The following data was reviewed by: ODETTE Yoon on 02/11/2022:    Point-of-care A1c collected in the office today was 6.9% indicating controlled type .  This is down from the prior result of 7.91 collected in September 2021    Most Recent A1C    HGBA1C Most Recent 2/11/22   Hemoglobin A1C 6.9             A1C Last 3 Results    HGBA1C Last 3 Results 6/4/21 9/7/21 2/11/22   Hemoglobin A1C 7.0 (A) 7.91 (A) 6.9   (A) Abnormal value       Comments are available for " some flowsheets but are not being displayed.                     Assessment: Patient is tolerating his medications without difficulty.  He has achieved controlled with his current A1c      Diagnoses and all orders for this visit:    1. Controlled type 2 diabetes mellitus without complication, without long-term current use of insulin (HCC) (Primary)  -     POC Glycosylated Hemoglobin (Hb A1C)        Plan: No changes were made to his treatment plan.  He will focus on medication compliance and dietary strategies to maintain his A1c in a controlled status.  He will be scheduled for routine follow-up appointment    The patient will monitor his blood glucose levels 1-2 times each day.  If he develops problematic hyperglycemia or hypoglycemia or adverse drug reaction, he will contact the office for further instructions.        Follow Up     Return in about 3 months (around 5/11/2022) for Medication Management.    Patient was given instructions and counseling regarding his condition or for health maintenance advice. Please see specific information pulled into the AVS if appropriate.     Kiley Bennett, APRN  02/11/2022     no back pain/no diaphoresis/no fever/no shortness of breath/no syncope/no vomiting

## 2022-02-17 RX ORDER — LISINOPRIL 5 MG/1
TABLET ORAL
Qty: 30 TABLET | Refills: 0 | Status: SHIPPED | OUTPATIENT
Start: 2022-02-17 | End: 2022-03-21

## 2022-03-21 RX ORDER — LISINOPRIL 5 MG/1
TABLET ORAL
Qty: 30 TABLET | Refills: 0 | Status: SHIPPED | OUTPATIENT
Start: 2022-03-21 | End: 2022-04-26

## 2022-04-20 RX ORDER — DULAGLUTIDE 1.5 MG/.5ML
INJECTION, SOLUTION SUBCUTANEOUS
Qty: 2 ML | Refills: 3 | Status: SHIPPED | OUTPATIENT
Start: 2022-04-20 | End: 2022-05-27

## 2022-04-25 RX ORDER — METFORMIN HYDROCHLORIDE 500 MG/1
1000 TABLET, EXTENDED RELEASE ORAL 2 TIMES DAILY
Qty: 360 TABLET | Refills: 0 | Status: SHIPPED | OUTPATIENT
Start: 2022-04-25 | End: 2022-07-29 | Stop reason: SDUPTHER

## 2022-04-26 RX ORDER — LISINOPRIL 5 MG/1
TABLET ORAL
Qty: 30 TABLET | Refills: 0 | Status: SHIPPED | OUTPATIENT
Start: 2022-04-26 | End: 2022-05-19 | Stop reason: SDUPTHER

## 2022-05-11 RX ORDER — ATORVASTATIN CALCIUM 20 MG/1
TABLET, FILM COATED ORAL
Qty: 90 TABLET | Refills: 0 | Status: SHIPPED | OUTPATIENT
Start: 2022-05-11 | End: 2022-05-19 | Stop reason: SDUPTHER

## 2022-05-11 NOTE — TELEPHONE ENCOUNTER
Rx Refill Note  Requested Prescriptions     Pending Prescriptions Disp Refills   • atorvastatin (LIPITOR) 20 MG tablet [Pharmacy Med Name: atorvastatin 20 mg tablet] 90 tablet 0     Sig: TAKE 1 TABLET BY MOUTH EVERY DAY      Last office visit with prescribing clinician: 6/14/2021      Next office visit with prescribing clinician: 5/19/2022  Lab: Lipid Panel (07/01/2021 09:40)    Velma Lund LPN  05/11/22, 11:29 EDT

## 2022-05-19 ENCOUNTER — OFFICE VISIT (OUTPATIENT)
Dept: FAMILY MEDICINE CLINIC | Age: 57
End: 2022-05-19

## 2022-05-19 ENCOUNTER — LAB (OUTPATIENT)
Dept: LAB | Facility: HOSPITAL | Age: 57
End: 2022-05-19

## 2022-05-19 VITALS
WEIGHT: 185.6 LBS | BODY MASS INDEX: 26.63 KG/M2 | SYSTOLIC BLOOD PRESSURE: 110 MMHG | DIASTOLIC BLOOD PRESSURE: 69 MMHG | HEART RATE: 67 BPM

## 2022-05-19 DIAGNOSIS — I10 ESSENTIAL HYPERTENSION: ICD-10-CM

## 2022-05-19 DIAGNOSIS — Z12.5 SCREENING FOR MALIGNANT NEOPLASM OF PROSTATE: ICD-10-CM

## 2022-05-19 DIAGNOSIS — E11.9 DIABETES MELLITUS WITHOUT COMPLICATION: ICD-10-CM

## 2022-05-19 DIAGNOSIS — E11.9 DIABETES MELLITUS WITHOUT COMPLICATION: Primary | ICD-10-CM

## 2022-05-19 DIAGNOSIS — Z00.00 ROUTINE GENERAL MEDICAL EXAMINATION AT A HEALTH CARE FACILITY: ICD-10-CM

## 2022-05-19 DIAGNOSIS — E78.49 OTHER HYPERLIPIDEMIA: ICD-10-CM

## 2022-05-19 LAB
ALBUMIN SERPL-MCNC: 5.1 G/DL (ref 3.5–5.2)
ALBUMIN UR-MCNC: <1.2 MG/DL
ALBUMIN/GLOB SERPL: 2.1 G/DL
ALP SERPL-CCNC: 80 U/L (ref 39–117)
ALT SERPL W P-5'-P-CCNC: 22 U/L (ref 1–41)
ANION GAP SERPL CALCULATED.3IONS-SCNC: 11.4 MMOL/L (ref 5–15)
AST SERPL-CCNC: 17 U/L (ref 1–40)
BASOPHILS # BLD AUTO: 0.01 10*3/MM3 (ref 0–0.2)
BASOPHILS NFR BLD AUTO: 0.2 % (ref 0–1.5)
BILIRUB SERPL-MCNC: 0.5 MG/DL (ref 0–1.2)
BUN SERPL-MCNC: 26 MG/DL (ref 6–20)
BUN/CREAT SERPL: 26.3 (ref 7–25)
CALCIUM SPEC-SCNC: 9.9 MG/DL (ref 8.6–10.5)
CHLORIDE SERPL-SCNC: 101 MMOL/L (ref 98–107)
CHOLEST SERPL-MCNC: 129 MG/DL (ref 0–200)
CO2 SERPL-SCNC: 24.6 MMOL/L (ref 22–29)
CREAT SERPL-MCNC: 0.99 MG/DL (ref 0.76–1.27)
CREAT UR-MCNC: 53.2 MG/DL
DEPRECATED RDW RBC AUTO: 41.9 FL (ref 37–54)
EGFRCR SERPLBLD CKD-EPI 2021: 90.5 ML/MIN/1.73
EOSINOPHIL # BLD AUTO: 0 10*3/MM3 (ref 0–0.4)
EOSINOPHIL NFR BLD AUTO: 0 % (ref 0.3–6.2)
ERYTHROCYTE [DISTWIDTH] IN BLOOD BY AUTOMATED COUNT: 12.5 % (ref 12.3–15.4)
GLOBULIN UR ELPH-MCNC: 2.4 GM/DL
GLUCOSE SERPL-MCNC: 135 MG/DL (ref 65–99)
HBA1C MFR BLD: 8 % (ref 4.8–5.6)
HCT VFR BLD AUTO: 46.6 % (ref 37.5–51)
HDLC SERPL-MCNC: 40 MG/DL (ref 40–60)
HGB BLD-MCNC: 15.6 G/DL (ref 13–17.7)
IMM GRANULOCYTES # BLD AUTO: 0 10*3/MM3 (ref 0–0.05)
IMM GRANULOCYTES NFR BLD AUTO: 0 % (ref 0–0.5)
LDLC SERPL CALC-MCNC: 75 MG/DL (ref 0–100)
LDLC/HDLC SERPL: 1.87 {RATIO}
LYMPHOCYTES # BLD AUTO: 1.43 10*3/MM3 (ref 0.7–3.1)
LYMPHOCYTES NFR BLD AUTO: 29.5 % (ref 19.6–45.3)
MCH RBC QN AUTO: 30.2 PG (ref 26.6–33)
MCHC RBC AUTO-ENTMCNC: 33.5 G/DL (ref 31.5–35.7)
MCV RBC AUTO: 90.3 FL (ref 79–97)
MICROALBUMIN/CREAT UR: NORMAL MG/G{CREAT}
MONOCYTES # BLD AUTO: 0.3 10*3/MM3 (ref 0.1–0.9)
MONOCYTES NFR BLD AUTO: 6.2 % (ref 5–12)
NEUTROPHILS NFR BLD AUTO: 3.1 10*3/MM3 (ref 1.7–7)
NEUTROPHILS NFR BLD AUTO: 64.1 % (ref 42.7–76)
PLATELET # BLD AUTO: 185 10*3/MM3 (ref 140–450)
PMV BLD AUTO: 8.5 FL (ref 6–12)
POTASSIUM SERPL-SCNC: 4.5 MMOL/L (ref 3.5–5.2)
PROT SERPL-MCNC: 7.5 G/DL (ref 6–8.5)
PSA SERPL-MCNC: 0.36 NG/ML (ref 0–4)
RBC # BLD AUTO: 5.16 10*6/MM3 (ref 4.14–5.8)
SODIUM SERPL-SCNC: 137 MMOL/L (ref 136–145)
TRIGL SERPL-MCNC: 71 MG/DL (ref 0–150)
VLDLC SERPL-MCNC: 14 MG/DL (ref 5–40)
WBC NRBC COR # BLD: 4.84 10*3/MM3 (ref 3.4–10.8)

## 2022-05-19 PROCEDURE — 99396 PREV VISIT EST AGE 40-64: CPT | Performed by: NURSE PRACTITIONER

## 2022-05-19 PROCEDURE — 80053 COMPREHEN METABOLIC PANEL: CPT

## 2022-05-19 PROCEDURE — 85025 COMPLETE CBC W/AUTO DIFF WBC: CPT

## 2022-05-19 PROCEDURE — 83036 HEMOGLOBIN GLYCOSYLATED A1C: CPT

## 2022-05-19 PROCEDURE — 80061 LIPID PANEL: CPT

## 2022-05-19 PROCEDURE — 82570 ASSAY OF URINE CREATININE: CPT

## 2022-05-19 PROCEDURE — 82043 UR ALBUMIN QUANTITATIVE: CPT

## 2022-05-19 PROCEDURE — 36415 COLL VENOUS BLD VENIPUNCTURE: CPT

## 2022-05-19 PROCEDURE — G0103 PSA SCREENING: HCPCS

## 2022-05-19 RX ORDER — LISINOPRIL 5 MG/1
5 TABLET ORAL DAILY
Qty: 90 TABLET | Refills: 1 | Status: SHIPPED | OUTPATIENT
Start: 2022-05-19 | End: 2022-11-28 | Stop reason: SDUPTHER

## 2022-05-19 RX ORDER — ATORVASTATIN CALCIUM 20 MG/1
20 TABLET, FILM COATED ORAL DAILY
Qty: 90 TABLET | Refills: 1 | Status: SHIPPED | OUTPATIENT
Start: 2022-05-19 | End: 2023-02-02

## 2022-05-19 NOTE — PROGRESS NOTES
Cholo Morejon presents to [facility].    Chief Complaint:  Diabetes, Hypertension, and Hyperlipidemia physical for work          History of Present Illness:  Diabetes:sees NIKHIL Bennett   Current medication: jardiance, trulicity, metformin  Tolerating medication: Yes  Last eye exam: 9-2021  Last foot exam: over a year ago   At home BS ranges: 140-180's  Lab Results       Component                Value               Date                       HGBA1C                   6.9                 02/11/2022              Hyperlipidemia  Current medication: lipitor   Tolerating medication: Yes  Needs Refill: yes /crume    Lab Results       Component                Value               Date                       CHOL                     159                 07/01/2021                 CHLPL                    113                 05/23/2020                 TRIG                     66                  07/01/2021                 HDL                      44                  07/01/2021                 LDL                      102 (H)             07/01/2021              Hypertension:  Current medication: lisinopril   Tolerating Medication: Yes  Checking BP at home and it is: not checking   Needs refills: Yes, joaquim  Labs:  Lab Results       Component                Value               Date                       GLUCOSE                  144 (H)             07/01/2021                 BUN                      14                  07/01/2021                 CREATININE               0.90                07/01/2021                 EGFRIFNONA               88                  07/01/2021                 BCR                      15.6                07/01/2021                 K                        5.0                 07/01/2021                 CO2                      27.3                07/01/2021                 CALCIUM                  9.5                 07/01/2021                 ALBUMIN                  4.80                 2021                 LABIL2                   1.8                 2020                 AST                      17                  2021                 ALT                      30                  2021              General Health Questions  Regular exercise as least 3 days a week: yes   Follows a healthy diet: tries   Wears seat belt always: yes   Drinks Alcohol: rarely   Uses Recreational drugs: none   Uses tobacco products: none   UTD on Tetanus vaccine: 3-  Last colon screen:2018m due in     PAST MEDICAL HISTORY changes since : none         elevated blood pressures     Type 2 Diabetes     basal skin cancer left side of face / an left arm and left scapula   [facility]         PREVENTIVE HEALTH MAINTENANCE             COLORECTAL CANCER SCREENING: Up to date (colonoscopy q10y; sigmoidoscopy q5y; Cologuard q3y) was last done 10-26-18, Results are in chart; colonoscopy with the following abnormalities noted-- spastic colon/ marginal prep         Surgical History:         Procedures:    Colonoscopy (  )         Family History:     Father: Coronary Artery Disease     Mother: Coronary Artery Disease;  Type 2 Diabetes  76/ renal failure     Brother(s): 2 brother(s) total; 1 ;  Lymphoma ( non Hodgkins at 19 y/o )     Sister(s): 1 sister(s) total;  Anxiety     Son(s): Healthy; 1 son(s) total     Daughter(s): Healthy; 2 daughter(s) total     Paternal Grandfather:  at age 60's; Cause of death was cancer     Paternal Grandmother:  at age 80's     Maternal Grandfather:  at age 80's     Maternal Grandmother:  at age 80's;  Type 2 Diabetes         Social History:     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   HENT: Negative for ear pain and sore throat.    Eyes: Negative for blurred vision.   Respiratory:  Negative for cough and shortness of breath.    Cardiovascular: Negative for chest pain, palpitations and leg swelling.   Gastrointestinal: Negative for abdominal pain, constipation, diarrhea, nausea and vomiting.   Genitourinary: Negative for dysuria.   Musculoskeletal: Negative for arthralgias and myalgias.   Skin: Negative for rash.   Neurological: Negative for dizziness, weakness and headache.   Psychiatric/Behavioral: Negative for sleep disturbance and depressed mood.          Current Outpatient Medications:   •  atorvastatin (LIPITOR) 20 MG tablet, Take 1 tablet by mouth Daily., Disp: 90 tablet, Rfl: 1  •  empagliflozin (JARDIANCE) 25 MG tablet tablet, Take 25 mg by mouth Daily., Disp: , Rfl:   •  lisinopril (PRINIVIL,ZESTRIL) 5 MG tablet, Take 1 tablet by mouth Daily., Disp: 90 tablet, Rfl: 1  •  metFORMIN ER (GLUCOPHAGE-XR) 500 MG 24 hr tablet, Take 2 tablets by mouth 2 (Two) Times a Day., Disp: 360 tablet, Rfl: 0  •  Trulicity 1.5 MG/0.5ML solution pen-injector, INJECT 1.5 MG UNDER THE SKIN ONCE WEEKLY, Disp: 2 mL, Rfl: 3  •  vitamin D3 125 MCG (5000 UT) capsule capsule, Take 5,000 Units by mouth Daily., Disp: , Rfl:     Vital Signs:   Vitals:    05/19/22 0849   BP: 110/69   BP Location: Right arm   Patient Position: Sitting   Pulse: 67   Weight: 84.2 kg (185 lb 9.6 oz)         Physical Exam:  Physical Exam  Constitutional:       Appearance: Normal appearance.   HENT:      Right Ear: Tympanic membrane normal.      Left Ear: Tympanic membrane normal.      Nose: Nose normal.   Eyes:      Pupils: Pupils are equal, round, and reactive to light.   Neck:      Vascular: No carotid bruit.   Cardiovascular:      Rate and Rhythm: Normal rate and regular rhythm.      Pulses:           Posterior tibial pulses are 2+ on the right side and 2+ on the left side.      Heart sounds: No murmur heard.  Pulmonary:      Effort: No respiratory distress.      Breath sounds: Normal breath sounds.   Abdominal:      Palpations:  Abdomen is soft. There is no mass.      Tenderness: There is no abdominal tenderness.   Musculoskeletal:         General: No swelling.   Feet:      Right foot:      Protective Sensation: 3 sites tested. 3 sites sensed.      Skin integrity: Skin integrity normal. No ulcer or blister.      Toenail Condition: Right toenails are abnormally thick.      Left foot:      Protective Sensation: 3 sites tested. 3 sites sensed.      Skin integrity: Skin integrity normal. No ulcer or blister.      Toenail Condition: Left toenails are abnormally thick.      Comments: Diabetic Foot Exam Performed and Monofilament Test Performed     Skin:     General: Skin is dry.   Neurological:      Mental Status: He is alert.   Psychiatric:         Mood and Affect: Mood normal.         Thought Content: Thought content normal.         Result Review      The following data was reviewed by: ODETTE Avila on 05/19/2022:    Results for orders placed or performed in visit on 02/11/22   POC Glycosylated Hemoglobin (Hb A1C)    Specimen: Blood   Result Value Ref Range    Hemoglobin A1C 6.9 %    Lot Number [id number]     Expiration Date 09/30/23                Assessment and Plan:          Diagnoses and all orders for this visit:    1. Diabetes mellitus without complication (HCC) (Primary)  Assessment & Plan:  He has an upcoming appt with NIKHIL Bennett, will recheck his A1C today, advised to do daily foot exams and keep follow up with optometry     Orders:  -     Hemoglobin A1c; Future  -     Microalbumin / Creatinine Urine Ratio - Urine, Clean Catch; Future    2. Essential hypertension  Assessment & Plan:  Continue BP rx    Orders:  -     lisinopril (PRINIVIL,ZESTRIL) 5 MG tablet; Take 1 tablet by mouth Daily.  Dispense: 90 tablet; Refill: 1    3. Other hyperlipidemia  Assessment & Plan:  Continue current medication and efforts with diet and exercise.       Orders:  -     atorvastatin (LIPITOR) 20 MG tablet; Take 1 tablet by mouth Daily.  Dispense:  90 tablet; Refill: 1    4. Screening for malignant neoplasm of prostate  -     PSA SCREENING; Future    5. Routine general medical examination at a health care facility  Assessment & Plan:  Reviewed EMD chart, UTD on tetanus; advised coved booster, declined   Follow a healthy diet and get regular exercise.  Always wear seat belts.        Orders:  -     Comprehensive Metabolic Panel; Future  -     Lipid Panel; Future  -     CBC w AUTO Differential; Future        Follow Up   Return in about 6 months (around 11/19/2022).  Patient was given instructions and counseling regarding his condition or for health maintenance advice. Please see specific information pulled into the AVS if appropriate.

## 2022-05-19 NOTE — ASSESSMENT & PLAN NOTE
He has an upcoming appt with NIKHIL Bennett, will recheck his A1C today, advised to do daily foot exams and keep follow up with optometry

## 2022-05-19 NOTE — ASSESSMENT & PLAN NOTE
Reviewed EMD chart, UTD on tetanus; advised coved booster, declined   Follow a healthy diet and get regular exercise.  Always wear seat belts.

## 2022-05-27 ENCOUNTER — OFFICE VISIT (OUTPATIENT)
Dept: DIABETES SERVICES | Facility: CLINIC | Age: 57
End: 2022-05-27

## 2022-05-27 VITALS
OXYGEN SATURATION: 99 % | SYSTOLIC BLOOD PRESSURE: 102 MMHG | WEIGHT: 184 LBS | HEIGHT: 71 IN | BODY MASS INDEX: 25.76 KG/M2 | HEART RATE: 84 BPM | DIASTOLIC BLOOD PRESSURE: 69 MMHG | TEMPERATURE: 97.9 F

## 2022-05-27 DIAGNOSIS — E11.65 UNCONTROLLED TYPE 2 DIABETES MELLITUS WITH HYPERGLYCEMIA: Primary | ICD-10-CM

## 2022-05-27 DIAGNOSIS — E11.9 TYPE 2 DIABETES MELLITUS WITHOUT COMPLICATION, WITHOUT LONG-TERM CURRENT USE OF INSULIN: ICD-10-CM

## 2022-05-27 PROCEDURE — 99214 OFFICE O/P EST MOD 30 MIN: CPT | Performed by: NURSE PRACTITIONER

## 2022-05-27 RX ORDER — DULAGLUTIDE 3 MG/.5ML
3 INJECTION, SOLUTION SUBCUTANEOUS
Qty: 2 ML | Refills: 5 | Status: SHIPPED | OUTPATIENT
Start: 2022-05-27 | End: 2022-06-18

## 2022-05-27 NOTE — PATIENT INSTRUCTIONS
Take 1 additional injection of the 1.5 mg Trulicity; the next week take 2 injections of the 1.5 mg strength that should use up what you have on hand.  Then start the new prescription of 3 mg 1 injection each week

## 2022-05-27 NOTE — PROGRESS NOTES
Chief Complaint  Diabetes (F/u and med refills on jardiance and look at others, pt did report having more stress at work and he is eating more of things he should not be because of the stress)    Referred By: No ref. provider found    Subjective          Cholo Morejon presents to [facility] for diabetes medication management    History of Present Illness    Visit type:  follow-up  Diabetes type:  Type 2  Current diabetes status/concerns/issues: He states he has not been eating as healthy as he has usually been eating.  Primarily due to stress eating  Other health concerns: No other health issues or anything going on with him  Diabetes symptoms:    Polyuria: No   Polydipsia: No   Polyphagia: No   Blurred vision: No   Excessive fatigue: No  Diabetes complications:  Neuro: None  Renal: None  Eyes: None  Amputation/Wounds: None  GI: None  Cardiovascular: HTN  ED: None  Other: None  Hypoglycemia:  None reported at this time  Hypoglycemia Symptoms:  No hypoglycemia at this time  Current diabetes treatment:  Jardiance 25 mg once a day, Metformin 1000 mg twice a day and Trulicity 1.5 mg once weekly   Blood glucose device:  Meter  Blood glucose monitoring frequency:  1  Blood glucose range/average:  160-170  Diet:  He states he has been snacking more on candy bars and such recently.  Activity/Exercise:  He is physically active with his work    Past Medical History:   Diagnosis Date   • Hx of skin cancer, basal cell    • Hyperlipidemia    • Hypertension    • Type 2 diabetes mellitus (HCC)      Past Surgical History:   Procedure Laterality Date   • SKIN CANCER EXCISION       Family History   Problem Relation Age of Onset   • Diabetes type II Other    • Heart disease Other    • Stroke Other    • No Known Problems Sister    • Cancer Brother         non hodgkin's lymphoma   • Diabetes Maternal Grandmother    • Lung cancer Maternal Grandfather    • No Known Problems Paternal Grandmother    • No  Known Problems Paternal Grandfather    • Coronary artery disease Mother         stent placement   • Coronary artery disease Father         triple bypass     Social History     Socioeconomic History   • Marital status:    Tobacco Use   • Smoking status: Never Smoker   • Smokeless tobacco: Never Used   Vaping Use   • Vaping Use: Never used   Substance and Sexual Activity   • Alcohol use: Yes     Comment: occasionally   • Drug use: Never   • Sexual activity: Defer     No Known Allergies    Current Outpatient Medications:   •  atorvastatin (LIPITOR) 20 MG tablet, Take 1 tablet by mouth Daily., Disp: 90 tablet, Rfl: 1  •  empagliflozin (JARDIANCE) 25 MG tablet tablet, Take 25 mg by mouth Daily., Disp: , Rfl:   •  lisinopril (PRINIVIL,ZESTRIL) 5 MG tablet, Take 1 tablet by mouth Daily., Disp: 90 tablet, Rfl: 1  •  metFORMIN ER (GLUCOPHAGE-XR) 500 MG 24 hr tablet, Take 2 tablets by mouth 2 (Two) Times a Day., Disp: 360 tablet, Rfl: 0  •  vitamin D3 125 MCG (5000 UT) capsule capsule, Take 5,000 Units by mouth Daily., Disp: , Rfl:   •  Dulaglutide (Trulicity) 3 MG/0.5ML solution pen-injector, Inject 0.5 mL under the skin into the appropriate area as directed Every 7 (Seven) Days for 4 doses., Disp: 2 mL, Rfl: 5    Review of Systems   Constitutional: Negative for activity change, appetite change, fatigue, fever, unexpected weight gain and unexpected weight loss.   HENT: Negative for congestion, ear pain, facial swelling, hearing loss, sore throat and tinnitus.    Eyes: Negative for blurred vision, double vision, redness and visual disturbance.   Respiratory: Negative for cough, shortness of breath and wheezing.    Cardiovascular: Negative for chest pain, palpitations and leg swelling.   Gastrointestinal: Negative for abdominal distention, constipation, diarrhea, nausea, vomiting, GERD and indigestion.   Endocrine: Negative for polydipsia, polyphagia and polyuria.   Genitourinary: Negative for difficulty urinating,  "frequency and urgency.   Musculoskeletal: Negative for back pain, gait problem and myalgias.   Skin: Negative for rash, skin lesions and wound.   Neurological: Negative for seizures, speech difficulty, weakness, headache and confusion.   Psychiatric/Behavioral: Positive for stress. Negative for sleep disturbance and depressed mood. The patient is not nervous/anxious.         Objective     Vitals:    05/27/22 1535   BP: 102/69   BP Location: Right arm   Patient Position: Sitting   Cuff Size: Adult   Pulse: 84   Temp: 97.9 °F (36.6 °C)   SpO2: 99%   Weight: 83.5 kg (184 lb)   Height: 180.3 cm (71\")   PainSc: 0-No pain     Body mass index is 25.66 kg/m².    Physical Exam  Constitutional:       Appearance: Normal appearance.      Comments: Overweight with BMI of 25.66   HENT:      Head: Normocephalic and atraumatic.      Right Ear: External ear normal.      Left Ear: External ear normal.      Nose: Nose normal.   Eyes:      Extraocular Movements: Extraocular movements intact.      Conjunctiva/sclera: Conjunctivae normal.   Pulmonary:      Effort: Pulmonary effort is normal.   Musculoskeletal:         General: Normal range of motion.      Cervical back: Normal range of motion.   Skin:     General: Skin is warm and dry.   Neurological:      General: No focal deficit present.      Mental Status: He is alert and oriented to person, place, and time. Mental status is at baseline.   Psychiatric:         Mood and Affect: Mood normal.         Behavior: Behavior normal.         Thought Content: Thought content normal.         Judgment: Judgment normal.         Result Review :   The following data was reviewed by: ODETTE Yoon on 05/27/2022:    A1c collected on 5/19/22 was 8% indicating uncontrolled type II diabetes.  This is up from the prior result of 6.9% collected in February of this year.    Most Recent A1C    HGBA1C Most Recent 5/19/22   Hemoglobin A1C 8.00 (A)   (A) Abnormal value              A1C Last 3 " Results    HGBA1C Last 3 Results 9/7/21 2/11/22 5/19/22   Hemoglobin A1C 7.91 (A) 6.9 8.00 (A)   (A) Abnormal value              Creatinine   Date Value Ref Range Status   05/19/2022 0.99 0.76 - 1.27 mg/dL Final   07/01/2021 0.90 0.76 - 1.27 mg/dL Final       eGFR   Date Value Ref Range Status   05/19/2022 90.5 >60.0 mL/min/1.73 Final     Comment:     National Kidney Foundation and American Society of Nephrology (ASN) Task Force recommended calculation based on the Chronic Kidney Disease Epidemiology Collaboration (CKD-EPI) equation refit without adjustment for race.       Microalbumin, Urine   Date Value Ref Range Status   05/19/2022 <1.2 mg/dL Final       Labs collected on 5/19/22 show normal renal function without microalbuminuria          Assessment: He has had an increase in his A1c but admits to poor dietary choices recently.  He is stress eating using snacks like candy bars.      Diagnoses and all orders for this visit:    1. Uncontrolled type 2 diabetes mellitus with hyperglycemia (HCC) (Primary)    2. Type 2 diabetes mellitus without complication, without long-term current use of insulin (HCC)    Other orders  -     Dulaglutide (Trulicity) 3 MG/0.5ML solution pen-injector; Inject 0.5 mL under the skin into the appropriate area as directed Every 7 (Seven) Days for 4 doses.  Dispense: 2 mL; Refill: 5        Plan: We will increase his Trulicity to 3 mg once weekly.  He will call our office if he does not tolerate the increase in the medication.  He is to focus on dietary strategies to control glucose levels as well.    The patient will monitor his blood glucose levels 1-2 times each day.  If he develops problematic hyperglycemia or hypoglycemia or adverse drug reactions, he will contact the office for further instructions.        Follow Up     Return in about 3 months (around 8/27/2022) for Medication Management.    Patient was given instructions and counseling regarding his condition or for health maintenance  advice. Please see specific information pulled into the AVS if appropriate.     Kiley Bennett, APRN  05/27/2022

## 2022-07-29 DIAGNOSIS — E11.65 UNCONTROLLED TYPE 2 DIABETES MELLITUS WITH HYPERGLYCEMIA: Primary | ICD-10-CM

## 2022-07-29 RX ORDER — METFORMIN HYDROCHLORIDE 500 MG/1
1000 TABLET, EXTENDED RELEASE ORAL 2 TIMES DAILY
Qty: 360 TABLET | Refills: 0 | Status: SHIPPED | OUTPATIENT
Start: 2022-07-29 | End: 2022-09-23 | Stop reason: SDUPTHER

## 2022-09-23 ENCOUNTER — OFFICE VISIT (OUTPATIENT)
Dept: DIABETES SERVICES | Facility: CLINIC | Age: 57
End: 2022-09-23

## 2022-09-23 VITALS
WEIGHT: 177 LBS | BODY MASS INDEX: 24.78 KG/M2 | OXYGEN SATURATION: 100 % | HEART RATE: 72 BPM | HEIGHT: 71 IN | SYSTOLIC BLOOD PRESSURE: 112 MMHG | DIASTOLIC BLOOD PRESSURE: 75 MMHG

## 2022-09-23 DIAGNOSIS — E11.9 TYPE 2 DIABETES MELLITUS WITHOUT COMPLICATION, WITHOUT LONG-TERM CURRENT USE OF INSULIN: ICD-10-CM

## 2022-09-23 DIAGNOSIS — E11.65 UNCONTROLLED TYPE 2 DIABETES MELLITUS WITH HYPERGLYCEMIA: Primary | ICD-10-CM

## 2022-09-23 LAB
EXPIRATION DATE: ABNORMAL
HBA1C MFR BLD: 7.1 %
Lab: ABNORMAL

## 2022-09-23 PROCEDURE — 99213 OFFICE O/P EST LOW 20 MIN: CPT | Performed by: NURSE PRACTITIONER

## 2022-09-23 RX ORDER — METFORMIN HYDROCHLORIDE 500 MG/1
1000 TABLET, EXTENDED RELEASE ORAL 2 TIMES DAILY
Qty: 360 TABLET | Refills: 1 | Status: SHIPPED | OUTPATIENT
Start: 2022-09-23

## 2022-09-23 RX ORDER — DULAGLUTIDE 3 MG/.5ML
3 INJECTION, SOLUTION SUBCUTANEOUS
COMMUNITY
Start: 2022-08-17 | End: 2022-11-22

## 2022-09-23 NOTE — PROGRESS NOTES
Chief Complaint  Diabetes (Blood sugars are a little higher sometimes)    Referred By: No ref. provider found    Subjective          Cholo Morejon presents to [facility] for diabetes medication management    History of Present Illness    Visit type:  follow-up  Diabetes type:  Type 2  Current diabetes status/concerns/issues: He states he feels like he is having to work a little harder to keep his glucose levels under control.  His fasting glucose levels are in the 140s and 150s at times which is a surprise to him.  He has tolerated the increase in the Trulicity made at the last appointment.  He has had a 7 pound weight loss since his last appointment  Other health concerns: He had some basal cell carcinoma spots removed from his head  Diabetes symptoms:    Polyuria: No   Polydipsia: Yes   Polyphagia: No   Blurred vision: No   Excessive fatigue: No  Diabetes complications:  Neuro: None  Renal: None  Eyes: None  Amputation/Wounds: None  GI: None  Cardiovascular: None  ED: None  Other: None  Hypoglycemia:  None reported at this time  Hypoglycemia Symptoms:  No hypoglycemia at this time  Current diabetes treatment:  Jardiance 25 mg once a day, Metformin 1000 mg twice a day and Trulicity 3 mg once weekly which was increased at the last appointment  Blood glucose device:  Meter  Blood glucose monitoring frequency:  1 - 2  Blood glucose range/average:  150s;   Diet:  Limits high carb/sweet foods, Avoids sugary drinks  Activity/Exercise:  He is physically active with work    Past Medical History:   Diagnosis Date   • Hx of skin cancer, basal cell    • Hyperlipidemia    • Hypertension    • Type 2 diabetes mellitus (HCC)      Past Surgical History:   Procedure Laterality Date   • SKIN CANCER EXCISION       Family History   Problem Relation Age of Onset   • Diabetes type II Other    • Heart disease Other    • Stroke Other    • No Known Problems Sister    • Cancer Brother         non hodgkin's  lymphoma   • Diabetes Maternal Grandmother    • Lung cancer Maternal Grandfather    • No Known Problems Paternal Grandmother    • No Known Problems Paternal Grandfather    • Coronary artery disease Mother         stent placement   • Coronary artery disease Father         triple bypass     Social History     Socioeconomic History   • Marital status:    Tobacco Use   • Smoking status: Never Smoker   • Smokeless tobacco: Never Used   Vaping Use   • Vaping Use: Never used   Substance and Sexual Activity   • Alcohol use: Yes     Comment: occasionally   • Drug use: Never   • Sexual activity: Defer     No Known Allergies    Current Outpatient Medications:   •  atorvastatin (LIPITOR) 20 MG tablet, Take 1 tablet by mouth Daily., Disp: 90 tablet, Rfl: 1  •  empagliflozin (JARDIANCE) 25 MG tablet tablet, Take 1 tablet by mouth Daily., Disp: 30 tablet, Rfl: 5  •  lisinopril (PRINIVIL,ZESTRIL) 5 MG tablet, Take 1 tablet by mouth Daily., Disp: 90 tablet, Rfl: 1  •  metFORMIN ER (GLUCOPHAGE-XR) 500 MG 24 hr tablet, Take 2 tablets by mouth 2 (Two) Times a Day., Disp: 360 tablet, Rfl: 1  •  Trulicity 3 MG/0.5ML solution pen-injector, Inject 3 mg under the skin into the appropriate area as directed Every 7 (Seven) Days., Disp: , Rfl:   •  vitamin D3 125 MCG (5000 UT) capsule capsule, Take 5,000 Units by mouth Daily., Disp: , Rfl:     Review of Systems   Constitutional: Positive for unexpected weight loss (7 pounds). Negative for activity change, appetite change, fatigue, fever and unexpected weight gain.   HENT: Negative for congestion, ear pain, facial swelling, hearing loss, sore throat and tinnitus.    Eyes: Negative for blurred vision, double vision, redness and visual disturbance.   Respiratory: Negative for cough, shortness of breath and wheezing.    Cardiovascular: Negative for chest pain, palpitations and leg swelling.   Gastrointestinal: Negative for abdominal distention, constipation, diarrhea, nausea, vomiting, GERD  "and indigestion.   Endocrine: Positive for polydipsia. Negative for polyphagia and polyuria.   Genitourinary: Negative for difficulty urinating, frequency and urgency.   Musculoskeletal: Negative for back pain, gait problem and myalgias.   Skin: Negative for rash, skin lesions and wound.   Neurological: Negative for seizures, speech difficulty, weakness, headache and confusion.   Psychiatric/Behavioral: Negative for sleep disturbance, depressed mood and stress. The patient is not nervous/anxious.         Objective     Vitals:    09/23/22 0935   BP: 112/75   BP Location: Right arm   Patient Position: Sitting   Cuff Size: Adult   Pulse: 72   SpO2: 100%   Weight: 80.3 kg (177 lb)   Height: 180.3 cm (71\")   PainSc: 0-No pain     Body mass index is 24.69 kg/m².    Physical Exam  Constitutional:       Appearance: Normal appearance. He is normal weight.   HENT:      Head: Normocephalic and atraumatic.      Right Ear: External ear normal.      Left Ear: External ear normal.      Nose: Nose normal.   Eyes:      Extraocular Movements: Extraocular movements intact.      Conjunctiva/sclera: Conjunctivae normal.   Pulmonary:      Effort: Pulmonary effort is normal.   Musculoskeletal:         General: Normal range of motion.      Cervical back: Normal range of motion.   Skin:     General: Skin is warm and dry.   Neurological:      General: No focal deficit present.      Mental Status: He is alert and oriented to person, place, and time. Mental status is at baseline.   Psychiatric:         Mood and Affect: Mood normal.         Behavior: Behavior normal.         Thought Content: Thought content normal.         Judgment: Judgment normal.         Result Review :   The following data was reviewed by: ODETTE Yoon on 09/23/2022:    Most Recent A1C    HGBA1C Most Recent 9/23/22   Hemoglobin A1C 7.1             A1C Last 3 Results    HGBA1C Last 3 Results 2/11/22 5/19/22 9/23/22   Hemoglobin A1C 6.9 8.00 (A) 7.1   (A) " Abnormal value            Point-of-care A1c collected in the office today was 7.1% indicating uncontrolled type 2 diabetes.  This is down from the prior result of 8% collected in May of this year            Assessment: He has had improvement in his A1c but remains just above target.  He is having some fasting hyperglycemia which is most likely a broderick effect.  This was explained to the patient.      Diagnoses and all orders for this visit:    1. Uncontrolled type 2 diabetes mellitus with hyperglycemia (HCC) (Primary)  -     POC Glycosylated Hemoglobin (Hb A1C)  -     metFORMIN ER (GLUCOPHAGE-XR) 500 MG 24 hr tablet; Take 2 tablets by mouth 2 (Two) Times a Day.  Dispense: 360 tablet; Refill: 1    2. Type 2 diabetes mellitus without complication, without long-term current use of insulin (HCC)        Plan: No changes were made to the treatment plan today.  If he notes increasing glucose levels he will contact our office for discussion and possible treatment adjustment.  Otherwise he will be scheduled for routine follow-up appointment    The patient will monitor his blood glucose levels 1-2 times each day.  If he develops problematic hyperglycemia or hypoglycemia or adverse drug reactions, he will contact the office for further instructions.        Follow Up     No follow-ups on file.    Patient was given instructions and counseling regarding his condition or for health maintenance advice. Please see specific information pulled into the AVS if appropriate.     Kiley Bennett, APRN  09/23/2022      Dictated Utilizing Dragon Dictation.  Please note that portions of this note were completed with a voice recognition program.  Part of this note may be an electronic transcription/translation of spoken language to printed text using the Dragon Dictation System.

## 2022-11-22 RX ORDER — DULAGLUTIDE 3 MG/.5ML
INJECTION, SOLUTION SUBCUTANEOUS
Qty: 2 ML | Refills: 5 | Status: SHIPPED | OUTPATIENT
Start: 2022-11-22 | End: 2023-02-24

## 2022-11-28 ENCOUNTER — LAB (OUTPATIENT)
Dept: LAB | Facility: HOSPITAL | Age: 57
End: 2022-11-28

## 2022-11-28 ENCOUNTER — OFFICE VISIT (OUTPATIENT)
Dept: FAMILY MEDICINE CLINIC | Age: 57
End: 2022-11-28

## 2022-11-28 VITALS
BODY MASS INDEX: 25.17 KG/M2 | HEIGHT: 71 IN | WEIGHT: 179.8 LBS | SYSTOLIC BLOOD PRESSURE: 130 MMHG | HEART RATE: 84 BPM | RESPIRATION RATE: 16 BRPM | DIASTOLIC BLOOD PRESSURE: 70 MMHG

## 2022-11-28 DIAGNOSIS — Z11.59 SCREENING FOR VIRAL DISEASE: ICD-10-CM

## 2022-11-28 DIAGNOSIS — I10 ESSENTIAL HYPERTENSION: ICD-10-CM

## 2022-11-28 DIAGNOSIS — E78.49 OTHER HYPERLIPIDEMIA: Primary | ICD-10-CM

## 2022-11-28 LAB
ALBUMIN SERPL-MCNC: 4.9 G/DL (ref 3.5–5.2)
ALBUMIN/GLOB SERPL: 1.6 G/DL
ALP SERPL-CCNC: 100 U/L (ref 39–117)
ALT SERPL W P-5'-P-CCNC: 32 U/L (ref 1–41)
ANION GAP SERPL CALCULATED.3IONS-SCNC: 10 MMOL/L (ref 5–15)
AST SERPL-CCNC: 12 U/L (ref 1–40)
BILIRUB SERPL-MCNC: 0.7 MG/DL (ref 0–1.2)
BUN SERPL-MCNC: 21 MG/DL (ref 6–20)
BUN/CREAT SERPL: 21.4 (ref 7–25)
CALCIUM SPEC-SCNC: 9.8 MG/DL (ref 8.6–10.5)
CHLORIDE SERPL-SCNC: 99 MMOL/L (ref 98–107)
CHOLEST SERPL-MCNC: 147 MG/DL (ref 0–200)
CO2 SERPL-SCNC: 27 MMOL/L (ref 22–29)
CREAT SERPL-MCNC: 0.98 MG/DL (ref 0.76–1.27)
EGFRCR SERPLBLD CKD-EPI 2021: 91.1 ML/MIN/1.73
GLOBULIN UR ELPH-MCNC: 3 GM/DL
GLUCOSE SERPL-MCNC: 137 MG/DL (ref 65–99)
HCV AB SER DONR QL: NORMAL
HDLC SERPL-MCNC: 41 MG/DL (ref 40–60)
LDLC SERPL CALC-MCNC: 90 MG/DL (ref 0–100)
LDLC/HDLC SERPL: 2.19 {RATIO}
POTASSIUM SERPL-SCNC: 4.7 MMOL/L (ref 3.5–5.2)
PROT SERPL-MCNC: 7.9 G/DL (ref 6–8.5)
SODIUM SERPL-SCNC: 136 MMOL/L (ref 136–145)
TRIGL SERPL-MCNC: 81 MG/DL (ref 0–150)
VLDLC SERPL-MCNC: 16 MG/DL (ref 5–40)

## 2022-11-28 PROCEDURE — 80061 LIPID PANEL: CPT | Performed by: NURSE PRACTITIONER

## 2022-11-28 PROCEDURE — 36415 COLL VENOUS BLD VENIPUNCTURE: CPT | Performed by: NURSE PRACTITIONER

## 2022-11-28 PROCEDURE — 86803 HEPATITIS C AB TEST: CPT | Performed by: NURSE PRACTITIONER

## 2022-11-28 PROCEDURE — 80053 COMPREHEN METABOLIC PANEL: CPT | Performed by: NURSE PRACTITIONER

## 2022-11-28 PROCEDURE — 99213 OFFICE O/P EST LOW 20 MIN: CPT | Performed by: NURSE PRACTITIONER

## 2022-11-28 RX ORDER — LISINOPRIL 5 MG/1
5 TABLET ORAL DAILY
Qty: 90 TABLET | Refills: 1 | Status: SHIPPED | OUTPATIENT
Start: 2022-11-28

## 2022-11-28 NOTE — ASSESSMENT & PLAN NOTE
Offered flu and covid vaccines, declines today.  Continue current medication and efforts with diet and exercise.

## 2022-11-28 NOTE — PROGRESS NOTES
Cholo BRUSH Zoiegerman presents to [facility].    Chief Complaint:  Hypertension, Hyperlipidemia, and Follow-up         History of Present Illness:  Hyperlipidemia  Current medication: lipitor   Tolerating medication: Yes  Needs Refill: No    Lab Results       Component                Value               Date                       CHOL                     129                 05/19/2022                 CHLPL                    113                 05/23/2020                 TRIG                     71                  05/19/2022                 HDL                      40                  05/19/2022                 LDL                      75                  05/19/2022              Hypertension:  Current medication: lisinopril   Tolerating Medication: Yes  Checking BP at home and it is: not checking   Needs refills: Yes to [facility]   Labs:  Lab Results       Component                Value               Date                       GLUCOSE                  135 (H)             05/19/2022                 BUN                      26 (H)              05/19/2022                 CREATININE               0.99                05/19/2022                 EGFRIFNONA               88                  07/01/2021                 BCR                      26.3 (H)            05/19/2022                 K                        4.5                 05/19/2022                 CO2                      24.6                05/19/2022                 CALCIUM                  9.9                 05/19/2022                 ALBUMIN                  5.10                05/19/2022                 LABIL2                   1.8                 05/23/2020                 AST                      17                  05/19/2022                 ALT                      22                  05/19/2022          Diabetes:  Adalberto Bennett  Lab Results       Component                Value               Date                       HGBA1C                    7.1                 2022              PAST MEDICAL HISTORY changes since :       Skin cancer,  Dg & treated earlier this year , face   HTN/ HLD     Type 2 Diabetes     basal skin cancer left side of face / an left arm and left scapula   [facility]         PREVENTIVE HEALTH MAINTENANCE             COLORECTAL CANCER SCREENING: Up to date (colonoscopy q10y; sigmoidoscopy q5y; Cologuard q3y) was last done 10-26-18, Results are in chart; colonoscopy with the following abnormalities noted-- spastic colon/ marginal prep         Surgical History:         Procedures:    Colonoscopy (  )         Family History:     Father: Coronary Artery Disease     Mother: Coronary Artery Disease;  Type 2 Diabetes  76/ renal failure     Brother(s): 2 brother(s) total; 1 ;  Lymphoma ( non Hodgkins at 17 y/o )     Sister(s): 1 sister(s) total;  Anxiety     Son(s): Healthy; 1 son(s) total     Daughter(s): Healthy; 2 daughter(s) total     Paternal Grandfather:  at age 60's; Cause of death was cancer     Paternal Grandmother:  at age 80's     Maternal Grandfather:  at age 80's     Maternal Grandmother:  at age 80's;  Type 2 Diabetes         Social History:     Occupation: [employer]     Marital Status:      Children: 3 children                   Review of Systems:  Review of Systems   Constitutional: Negative for fatigue and fever.   Respiratory: Negative for cough and shortness of breath.    Cardiovascular: Negative for chest pain, palpitations and leg swelling.   Neurological: Negative for numbness.          Current Outpatient Medications:   •  atorvastatin (LIPITOR) 20 MG tablet, Take 1 tablet by mouth Daily., Disp: 90 tablet, Rfl: 1  •  empagliflozin (JARDIANCE) 25 MG tablet tablet, Take 1 tablet by mouth Daily., Disp: 30 tablet, Rfl: 5  •  lisinopril (PRINIVIL,ZESTRIL) 5 MG tablet, Take 1 tablet by mouth Daily., Disp: 90 tablet, Rfl: 1  •  metFORMIN ER  "(GLUCOPHAGE-XR) 500 MG 24 hr tablet, Take 2 tablets by mouth 2 (Two) Times a Day., Disp: 360 tablet, Rfl: 1  •  Trulicity 3 MG/0.5ML solution pen-injector, ADMINISTER 0.5 ML UNDER THE SKIN EVERY 7 DAYS FOR 4 DOSES AS DIRECTED, Disp: 2 mL, Rfl: 5  •  vitamin D3 125 MCG (5000 UT) capsule capsule, Take 5,000 Units by mouth Daily., Disp: , Rfl:     Vital Signs:   Vitals:    11/28/22 0848   BP: 130/70   BP Location: Left arm   Patient Position: Sitting   Cuff Size: Adult   Pulse: 84   Resp: 16   Weight: 81.6 kg (179 lb 12.8 oz)   Height: 180.3 cm (71\")   PainSc: 0-No pain         Physical Exam:  Physical Exam  Vitals reviewed.   Constitutional:       General: He is not in acute distress.     Appearance: Normal appearance.   Neck:      Vascular: No carotid bruit.   Cardiovascular:      Rate and Rhythm: Normal rate and regular rhythm.      Heart sounds: Normal heart sounds. No murmur heard.  Pulmonary:      Effort: Pulmonary effort is normal. No respiratory distress.      Breath sounds: Normal breath sounds.   Musculoskeletal:      Right lower leg: No edema.      Left lower leg: No edema.   Neurological:      Mental Status: He is alert.   Psychiatric:         Mood and Affect: Mood normal.         Behavior: Behavior normal.         Result Review      The following data was reviewed by: ODETTE Avila on 11/28/2022:    Results for orders placed or performed in visit on 11/28/22   Comprehensive Metabolic Panel    Specimen: Blood   Result Value Ref Range    Glucose 137 (H) 65 - 99 mg/dL    BUN 21 (H) 6 - 20 mg/dL    Creatinine 0.98 0.76 - 1.27 mg/dL    Sodium 136 136 - 145 mmol/L    Potassium 4.7 3.5 - 5.2 mmol/L    Chloride 99 98 - 107 mmol/L    CO2 27.0 22.0 - 29.0 mmol/L    Calcium 9.8 8.6 - 10.5 mg/dL    Total Protein 7.9 6.0 - 8.5 g/dL    Albumin 4.90 3.50 - 5.20 g/dL    ALT (SGPT) 32 1 - 41 U/L    AST (SGOT) 12 1 - 40 U/L    Alkaline Phosphatase 100 39 - 117 U/L    Total Bilirubin 0.7 0.0 - 1.2 mg/dL    " Globulin 3.0 gm/dL    A/G Ratio 1.6 g/dL    BUN/Creatinine Ratio 21.4 7.0 - 25.0    Anion Gap 10.0 5.0 - 15.0 mmol/L    eGFR 91.1 >60.0 mL/min/1.73   Lipid Panel    Specimen: Blood   Result Value Ref Range    Total Cholesterol 147 0 - 200 mg/dL    Triglycerides 81 0 - 150 mg/dL    HDL Cholesterol 41 40 - 60 mg/dL    LDL Cholesterol  90 0 - 100 mg/dL    VLDL Cholesterol 16 5 - 40 mg/dL    LDL/HDL Ratio 2.19    Hepatitis C antibody    Specimen: Blood   Result Value Ref Range    Hepatitis C Ab Non-Reactive Non-Reactive               Assessment and Plan:          Diagnoses and all orders for this visit:    1. Other hyperlipidemia (Primary)  Assessment & Plan:  Offered flu and covid vaccines, declines today.  Continue current medication and efforts with diet and exercise.       Orders:  -     Comprehensive Metabolic Panel  -     Lipid Panel    2. Essential hypertension  Assessment & Plan:  Hypertension is stable.   to monitor BP at home. Continue current meds. Continue to modify diet and lifestyle. Will need labs every 6 months and follow up.       Orders:  -     Comprehensive Metabolic Panel  -     Lipid Panel  -     lisinopril (PRINIVIL,ZESTRIL) 5 MG tablet; Take 1 tablet by mouth Daily.  Dispense: 90 tablet; Refill: 1    3. Screening for viral disease  -     Hepatitis C antibody        Follow Up   Return in about 6 months (around 5/28/2023).  Patient was given instructions and counseling regarding his condition or for health maintenance advice. Please see specific information pulled into the AVS if appropriate.

## 2022-11-28 NOTE — ASSESSMENT & PLAN NOTE
Hypertension is stable.   to monitor BP at home. Continue current meds. Continue to modify diet and lifestyle. Will need labs every 6 months and follow up.

## 2022-12-04 RX ORDER — EMPAGLIFLOZIN 25 MG/1
TABLET, FILM COATED ORAL
Qty: 30 TABLET | Refills: 5 | Status: SHIPPED | OUTPATIENT
Start: 2022-12-04

## 2023-01-13 ENCOUNTER — OFFICE VISIT (OUTPATIENT)
Dept: DIABETES SERVICES | Facility: CLINIC | Age: 58
End: 2023-01-13
Payer: COMMERCIAL

## 2023-01-13 VITALS
SYSTOLIC BLOOD PRESSURE: 118 MMHG | TEMPERATURE: 97.6 F | OXYGEN SATURATION: 100 % | HEIGHT: 71 IN | DIASTOLIC BLOOD PRESSURE: 70 MMHG | WEIGHT: 185 LBS | BODY MASS INDEX: 25.9 KG/M2 | HEART RATE: 74 BPM

## 2023-01-13 DIAGNOSIS — E11.65 UNCONTROLLED TYPE 2 DIABETES MELLITUS WITH HYPERGLYCEMIA: Primary | ICD-10-CM

## 2023-01-13 DIAGNOSIS — E66.3 OVERWEIGHT (BMI 25.0-29.9): ICD-10-CM

## 2023-01-13 DIAGNOSIS — E11.9 TYPE 2 DIABETES MELLITUS WITHOUT COMPLICATION, WITHOUT LONG-TERM CURRENT USE OF INSULIN: ICD-10-CM

## 2023-01-13 LAB
EXPIRATION DATE: ABNORMAL
HBA1C MFR BLD: 7.4 %
Lab: ABNORMAL

## 2023-01-13 PROCEDURE — 83036 HEMOGLOBIN GLYCOSYLATED A1C: CPT | Performed by: NURSE PRACTITIONER

## 2023-01-13 PROCEDURE — 99213 OFFICE O/P EST LOW 20 MIN: CPT | Performed by: NURSE PRACTITIONER

## 2023-01-13 NOTE — PROGRESS NOTES
Chief Complaint  Diabetes (Follow up, med mgt, a1c eval )    Referred By: No ref. provider found    Subjective          Cholo Morejon presents to [facility] for diabetes medication management    History of Present Illness    Visit type:  follow-up  Diabetes type:  Type 2  Current diabetes status/concerns/issues: He states for some reason he had some high blood sugars for a while but they have settled down.  He does say his glucose levels are little higher around the holidays.  Other health concerns: He had skin cancer removed from his forehead and behind his right ear.  This was basal cell.  He has had a 3 pound weight gain  Current Diabetes symptoms:    Polyuria: No   Polydipsia: No   Polyphagia: No   Blurred vision: No   Excessive fatigue: No   Known Diabetes complications:  Neuro: None  Renal: None  Eyes: None  Amputation/Wounds: None  GI: None  Cardiovascular: None  ED: None   Other: None  Hypoglycemia:  None reported at this time  Hypoglycemia Symptoms:  No hypoglycemia at this time  Current diabetes treatment:  Jardiance 25 mg once a day, Metformin 1000 mg twice a day and Trulicity 3 mg once weekly   Blood glucose device:  Meter  Blood glucose monitoring frequency:  1 - 2  Blood glucose range/average:  100-120  Diet:  He does admit to indulging in some candies and treats over the holidays.  Activity/Exercise:  He is active with work    Past Medical History:   Diagnosis Date   • Hx of skin cancer, basal cell    • Hyperlipidemia    • Hypertension    • Type 2 diabetes mellitus (HCC)      Past Surgical History:   Procedure Laterality Date   • SKIN CANCER EXCISION       Family History   Problem Relation Age of Onset   • Diabetes type II Other    • Heart disease Other    • Stroke Other    • No Known Problems Sister    • Cancer Brother         non hodgkin's lymphoma   • Diabetes Maternal Grandmother    • Lung cancer Maternal Grandfather    • No Known Problems Paternal Grandmother    •  "No Known Problems Paternal Grandfather    • Coronary artery disease Mother         stent placement   • Coronary artery disease Father         triple bypass     Social History     Socioeconomic History   • Marital status:    Tobacco Use   • Smoking status: Never   • Smokeless tobacco: Never   Vaping Use   • Vaping Use: Never used   Substance and Sexual Activity   • Alcohol use: Yes     Comment: occasionally   • Drug use: Never   • Sexual activity: Defer     No Known Allergies    Current Outpatient Medications:   •  atorvastatin (LIPITOR) 20 MG tablet, Take 1 tablet by mouth Daily., Disp: 90 tablet, Rfl: 1  •  Jardiance 25 MG tablet tablet, Take 1 tablet BY MOUTH EVERY DAY, Disp: 30 tablet, Rfl: 5  •  lisinopril (PRINIVIL,ZESTRIL) 5 MG tablet, Take 1 tablet by mouth Daily., Disp: 90 tablet, Rfl: 1  •  metFORMIN ER (GLUCOPHAGE-XR) 500 MG 24 hr tablet, Take 2 tablets by mouth 2 (Two) Times a Day., Disp: 360 tablet, Rfl: 1  •  Trulicity 3 MG/0.5ML solution pen-injector, ADMINISTER 0.5 ML UNDER THE SKIN EVERY 7 DAYS FOR 4 DOSES AS DIRECTED, Disp: 2 mL, Rfl: 5  •  vitamin D3 125 MCG (5000 UT) capsule capsule, Take 5,000 Units by mouth Daily., Disp: , Rfl:     Review of Systems   Constitutional: Negative for activity change, appetite change, fatigue, unexpected weight gain and unexpected weight loss.   Eyes: Negative for blurred vision and visual disturbance.   Gastrointestinal: Negative for abdominal pain, constipation, diarrhea, nausea, vomiting, GERD and indigestion.   Endocrine: Negative for polydipsia, polyphagia and polyuria.   Neurological: Negative for numbness.        Objective     Vitals:    01/13/23 1334   BP: 118/70   BP Location: Right arm   Patient Position: Sitting   Cuff Size: Adult   Pulse: 74   Temp: 97.6 °F (36.4 °C)   SpO2: 100%   Weight: 83.9 kg (185 lb)   Height: 180.3 cm (71\")   PainSc: 0-No pain     Body mass index is 25.8 kg/m².    Physical Exam  Constitutional:       Appearance: Normal " appearance.      Comments: Overweight with BMI of 25.8   HENT:      Head: Normocephalic and atraumatic.      Right Ear: External ear normal.      Left Ear: External ear normal.      Nose: Nose normal.   Eyes:      Extraocular Movements: Extraocular movements intact.      Conjunctiva/sclera: Conjunctivae normal.   Pulmonary:      Effort: Pulmonary effort is normal.   Musculoskeletal:         General: Normal range of motion.      Cervical back: Normal range of motion.   Skin:     General: Skin is warm and dry.   Neurological:      General: No focal deficit present.      Mental Status: He is alert and oriented to person, place, and time. Mental status is at baseline.   Psychiatric:         Mood and Affect: Mood normal.         Behavior: Behavior normal.         Thought Content: Thought content normal.         Judgment: Judgment normal.         Result Review :   The following data was reviewed by: ODETTE Yoon on 01/13/2023:    Most Recent A1C    HGBA1C Most Recent 1/13/23   Hemoglobin A1C 7.4 (A)   (A) Abnormal value              A1C Last 3 Results    HGBA1C Last 3 Results 5/19/22 9/23/22 1/13/23   Hemoglobin A1C 8.00 (A) 7.1 7.4 (A)   (A) Abnormal value            Point-of-care A1c in the office today 7.4% indicating uncontrolled type 2 diabetes.  This is up from the prior result of 7.1 collected in September 2022      Creatinine   Date Value Ref Range Status   11/28/2022 0.98 0.76 - 1.27 mg/dL Final   05/19/2022 0.99 0.76 - 1.27 mg/dL Final     eGFR   Date Value Ref Range Status   11/28/2022 91.1 >60.0 mL/min/1.73 Final     Comment:     National Kidney Foundation and American Society of Nephrology (ASN) Task Force recommended calculation based on the Chronic Kidney Disease Epidemiology Collaboration (CKD-EPI) equation refit without adjustment for race.   05/19/2022 90.5 >60.0 mL/min/1.73 Final     Comment:     National Kidney Foundation and American Society of Nephrology (ASN) Task Force recommended  calculation based on the Chronic Kidney Disease Epidemiology Collaboration (CKD-EPI) equation refit without adjustment for race.     Labs collected on 11/28/2022 showed normal renal function      Total Cholesterol   Date Value Ref Range Status   11/28/2022 147 0 - 200 mg/dL Final   05/19/2022 129 0 - 200 mg/dL Final     Triglycerides   Date Value Ref Range Status   11/28/2022 81 0 - 150 mg/dL Final   05/19/2022 71 0 - 150 mg/dL Final     HDL Cholesterol   Date Value Ref Range Status   11/28/2022 41 40 - 60 mg/dL Final   05/19/2022 40 40 - 60 mg/dL Final     LDL Cholesterol    Date Value Ref Range Status   11/28/2022 90 0 - 100 mg/dL Final   05/19/2022 75 0 - 100 mg/dL Final     Lipid panel collected 11/28/2022 was within normal limits            Assessment: The patient has had a slight increase in his A1c.  He does admit to poor dietary choices during the holidays and he has had a 3 pound weight gain.  We discussed increasing his medications versus focusing on dietary behavior.      Diagnoses and all orders for this visit:    1. Uncontrolled type 2 diabetes mellitus with hyperglycemia (HCC) (Primary)  -     POC Glycosylated Hemoglobin (Hb A1C)    2. Type 2 diabetes mellitus without complication, without long-term current use of insulin (HCC)    3. Overweight (BMI 25.0-29.9)        Plan: At this time the patient will focus on dietary changes to help promote glucose control as well as weight loss.  If his A1c does not improve at the next visit we may consider increasing his Trulicity once again.    The patient will monitor his blood glucose levels 1-2 times each day.  If he develops problematic hyperglycemia or hypoglycemia or adverse drug reactions, he will contact the office for further instructions.        Follow Up     No follow-ups on file.    Patient was given instructions and counseling regarding his condition or for health maintenance advice. Please see specific information pulled into the AVS if appropriate.      Kiley Bennett, APRN  01/13/2023      Dictated Utilizing Dragon Dictation.  Please note that portions of this note were completed with a voice recognition program.  Part of this note may be an electronic transcription/translation of spoken language to printed text using the Dragon Dictation System.

## 2023-02-02 DIAGNOSIS — E78.49 OTHER HYPERLIPIDEMIA: ICD-10-CM

## 2023-02-02 RX ORDER — ATORVASTATIN CALCIUM 20 MG/1
TABLET, FILM COATED ORAL
Qty: 90 TABLET | Refills: 1 | Status: SHIPPED | OUTPATIENT
Start: 2023-02-02

## 2023-02-02 NOTE — TELEPHONE ENCOUNTER
Rx Refill Note  Requested Prescriptions     Pending Prescriptions Disp Refills   • atorvastatin (LIPITOR) 20 MG tablet [Pharmacy Med Name: atorvastatin 20 mg tablet] 90 tablet 1     Sig: Take 1 tablet BY MOUTH EVERY NIGHT AT BEDTIME      Last office visit with prescribing clinician: 11/28/2022   Last telemedicine visit with prescribing clinician: 5/24/2023   Next office visit with prescribing clinician: 5/24/2023                         Would you like a call back once the refill request has been completed: [] Yes [] No    If the office needs to give you a call back, can they leave a voicemail: [] Yes [] No    Ilene Doe LPN  02/02/23, 10:11 EST

## 2023-02-21 ENCOUNTER — TELEPHONE (OUTPATIENT)
Dept: DIABETES SERVICES | Facility: HOSPITAL | Age: 58
End: 2023-02-21
Payer: COMMERCIAL

## 2023-02-24 ENCOUNTER — TELEPHONE (OUTPATIENT)
Dept: DIABETES SERVICES | Facility: CLINIC | Age: 58
End: 2023-02-24
Payer: COMMERCIAL

## 2023-02-24 DIAGNOSIS — E11.65 UNCONTROLLED TYPE 2 DIABETES MELLITUS WITH HYPERGLYCEMIA: Primary | ICD-10-CM

## 2023-02-24 RX ORDER — TIRZEPATIDE 10 MG/.5ML
10 INJECTION, SOLUTION SUBCUTANEOUS
Qty: 2 ML | Refills: 2 | Status: SHIPPED | OUTPATIENT
Start: 2023-02-24 | End: 2023-03-29 | Stop reason: SDUPTHER

## 2023-02-24 NOTE — TELEPHONE ENCOUNTER
The patient has called multiple pharmacies and is unable to get his Trulicity.  We will discontinue the Trulicity and try Mounjaro 10 mg once weekly.  We will transition him to this dose since the patient has been accustomed to using Trulicity 3 mg once weekly.  The patient was advised to contact the office if he has any problems tolerating the medication so the dose can be adjusted as needed.

## 2023-03-29 DIAGNOSIS — E11.65 UNCONTROLLED TYPE 2 DIABETES MELLITUS WITH HYPERGLYCEMIA: ICD-10-CM

## 2023-03-29 RX ORDER — TIRZEPATIDE 10 MG/.5ML
10 INJECTION, SOLUTION SUBCUTANEOUS
Qty: 2 ML | Refills: 2 | Status: SHIPPED | OUTPATIENT
Start: 2023-03-29

## 2023-05-11 NOTE — PROGRESS NOTES
Chief Complaint  Diabetes    Referred By: No ref. provider found    Subjective          Cholo Morejon presents to [facility] for diabetes medication management    History of Present Illness    Visit type:  follow-up  Diabetes type:  Type 2  Current diabetes status/concerns/issues:  He has lost 18 pounds since his last appointment.  He has tolerated the Mounjaro without much difficulty.  He does have some mild GI distress for couple days after the injection but otherwise is tolerating it well.  Other health concerns: None reported  Current Diabetes symptoms:    Polyuria: No   Polydipsia: No   Polyphagia: No   Blurred vision: No   Excessive fatigue: No   Known Diabetes complications:  Neuropathy: None; Location: N/A  Renal: None  Eyes: None; Location: N/A  Amputation/Wounds: None  GI: None  Cardiovascular: Hypertension and Hyperlipidemia  ED: None  Other: None  Hypoglycemia:  Level 1 hypoglycemia (54 mg/dL - 70 mg/dL); Frequency - He has had a rare incident of low glucose levels below 70  Hypoglycemia Symptoms:  shaking/tremors and sweating  Current diabetes treatment:   Jardiance 25 mg once a day, Metformin 1000 mg twice a day and Mounjaro 10 mg once weekly    Blood glucose device:  Meter  Blood glucose monitoring frequency:  1 - 2  Blood glucose range/average:       Glucose Source: Patient Reported  Diet:  he has had a decrease in appetite  Activity/Exercise:  active with work    Past Medical History:   Diagnosis Date   • Hx of skin cancer, basal cell    • Hyperlipidemia    • Hypertension    • Type 2 diabetes mellitus      Past Surgical History:   Procedure Laterality Date   • SKIN CANCER EXCISION       Family History   Problem Relation Age of Onset   • Diabetes type II Other    • Heart disease Other    • Stroke Other    • No Known Problems Sister    • Cancer Brother         non hodgkin's lymphoma   • Diabetes Maternal Grandmother    • Lung cancer Maternal Grandfather    • No  "Known Problems Paternal Grandmother    • No Known Problems Paternal Grandfather    • Coronary artery disease Mother         stent placement   • Coronary artery disease Father         triple bypass     Social History     Socioeconomic History   • Marital status:    Tobacco Use   • Smoking status: Never   • Smokeless tobacco: Never   Vaping Use   • Vaping Use: Never used   Substance and Sexual Activity   • Alcohol use: Yes     Comment: occasionally   • Drug use: Never   • Sexual activity: Defer     No Known Allergies    Current Outpatient Medications:   •  atorvastatin (LIPITOR) 20 MG tablet, Take 1 tablet BY MOUTH EVERY NIGHT AT BEDTIME, Disp: 90 tablet, Rfl: 1  •  Jardiance 25 MG tablet tablet, Take 1 tablet BY MOUTH EVERY DAY, Disp: 30 tablet, Rfl: 5  •  lisinopril (PRINIVIL,ZESTRIL) 5 MG tablet, Take 1 tablet by mouth Daily., Disp: 90 tablet, Rfl: 1  •  metFORMIN ER (GLUCOPHAGE-XR) 500 MG 24 hr tablet, Take 2 tablets by mouth 2 (Two) Times a Day., Disp: 360 tablet, Rfl: 1  •  NON FORMULARY / PATIENT SUPPLIED MEDICATION, ALTI Balance Supplement, Disp: , Rfl:   •  Tirzepatide (Mounjaro) 10 MG/0.5ML solution pen-injector, Inject 0.5 mL under the skin into the appropriate area as directed Every 7 (Seven) Days., Disp: 6 mL, Rfl: 1  •  vitamin D3 125 MCG (5000 UT) capsule capsule, Take 1 capsule by mouth Daily., Disp: , Rfl:     Objective     Vitals:    05/12/23 1253   BP: 109/67   Pulse: 89   SpO2: 97%   Weight: 76 kg (167 lb 9.6 oz)   Height: 180.3 cm (71\")     Body mass index is 23.38 kg/m².    Physical Exam  Constitutional:       Appearance: Normal appearance. He is normal weight.   HENT:      Head: Normocephalic and atraumatic.      Right Ear: External ear normal.      Left Ear: External ear normal.      Nose: Nose normal.   Eyes:      Extraocular Movements: Extraocular movements intact.      Conjunctiva/sclera: Conjunctivae normal.   Pulmonary:      Effort: Pulmonary effort is normal.   Musculoskeletal:    "      General: Normal range of motion.      Cervical back: Normal range of motion.   Skin:     General: Skin is warm and dry.   Neurological:      General: No focal deficit present.      Mental Status: He is alert and oriented to person, place, and time. Mental status is at baseline.   Psychiatric:         Mood and Affect: Mood normal.         Behavior: Behavior normal.         Thought Content: Thought content normal.         Judgment: Judgment normal.             Result Review :   The following data was reviewed by: ODETTE Yoon on 05/12/2023:    Most Recent A1C        5/12/2023    13:05   HGBA1C Most Recent   Hemoglobin A1C 6.6         A1C Last 3 Results        9/23/2022    09:47 1/13/2023    13:44 5/12/2023    13:05   HGBA1C Last 3 Results   Hemoglobin A1C 7.1   7.4   6.6       Point-of-care A1c in the office today 6.6% indicating controlled type 2 diabetes.  This is down from the prior result of 7.4 collected in January of this year.          Assessment: The patient was transitioned off of Trulicity and onto Mounjaro at the last appointment.  He has some mild GI distress after taking the injections but is otherwise tolerating the medication without difficulty.  He has had an 18 pound weight loss since starting the medication.  He does have some concern and does not want to lose too much weight.  His current BMI is within normal limits.  The patient was instructed to ensure he gets his protein consumed first and eats healthy carbohydrates.      Diagnoses and all orders for this visit:    1. Controlled type 2 diabetes mellitus without complication, without long-term current use of insulin (Primary)  -     POC Glycosylated Hemoglobin (Hb A1C)  -     Tirzepatide (Mounjaro) 10 MG/0.5ML solution pen-injector; Inject 0.5 mL under the skin into the appropriate area as directed Every 7 (Seven) Days.  Dispense: 6 mL; Refill: 1        Plan: No changes were made to his treatment plan.  The patient was advised if  he starts to experience problematic hypoglycemia he may discontinue the metformin.  If he continues to have problematic weight loss then he will contact the office and we will come up with an alternate plan.    The patient will monitor his blood glucose levels 1-2 times each day.  If he develops problematic hyperglycemia or hypoglycemia or adverse drug reactions, he will contact the office for further instructions.        Follow Up     Return in about 3 months (around 8/12/2023) for Medication Management.    Patient was given instructions and counseling regarding his condition or for health maintenance advice. Please see specific information pulled into the AVS if appropriate.     Kiley Bennett, ODETTE  05/12/2023      Dictated Utilizing Dragon Dictation.  Please note that portions of this note were completed with a voice recognition program.  Part of this note may be an electronic transcription/translation of spoken language to printed text using the Dragon Dictation System.

## 2023-05-12 ENCOUNTER — OFFICE VISIT (OUTPATIENT)
Dept: DIABETES SERVICES | Facility: CLINIC | Age: 58
End: 2023-05-12
Payer: COMMERCIAL

## 2023-05-12 VITALS
HEIGHT: 71 IN | WEIGHT: 167.6 LBS | SYSTOLIC BLOOD PRESSURE: 109 MMHG | OXYGEN SATURATION: 97 % | DIASTOLIC BLOOD PRESSURE: 67 MMHG | HEART RATE: 89 BPM | BODY MASS INDEX: 23.46 KG/M2

## 2023-05-12 DIAGNOSIS — E11.9 CONTROLLED TYPE 2 DIABETES MELLITUS WITHOUT COMPLICATION, WITHOUT LONG-TERM CURRENT USE OF INSULIN: Primary | ICD-10-CM

## 2023-05-12 LAB
EXPIRATION DATE: NORMAL
HBA1C MFR BLD: 6.6 %
Lab: 579

## 2023-05-12 PROCEDURE — 99213 OFFICE O/P EST LOW 20 MIN: CPT | Performed by: NURSE PRACTITIONER

## 2023-05-12 RX ORDER — TIRZEPATIDE 10 MG/.5ML
10 INJECTION, SOLUTION SUBCUTANEOUS
Qty: 6 ML | Refills: 1 | Status: SHIPPED | OUTPATIENT
Start: 2023-05-12

## 2023-05-13 DIAGNOSIS — E11.65 UNCONTROLLED TYPE 2 DIABETES MELLITUS WITH HYPERGLYCEMIA: ICD-10-CM

## 2023-05-14 RX ORDER — METFORMIN HYDROCHLORIDE 500 MG/1
1000 TABLET, EXTENDED RELEASE ORAL 2 TIMES DAILY
Qty: 360 TABLET | Refills: 1 | Status: SHIPPED | OUTPATIENT
Start: 2023-05-14

## 2023-05-24 ENCOUNTER — LAB (OUTPATIENT)
Dept: LAB | Facility: HOSPITAL | Age: 58
End: 2023-05-24
Payer: COMMERCIAL

## 2023-05-24 ENCOUNTER — OFFICE VISIT (OUTPATIENT)
Dept: FAMILY MEDICINE CLINIC | Age: 58
End: 2023-05-24
Payer: COMMERCIAL

## 2023-05-24 VITALS
BODY MASS INDEX: 23.6 KG/M2 | HEIGHT: 71 IN | DIASTOLIC BLOOD PRESSURE: 71 MMHG | WEIGHT: 168.6 LBS | OXYGEN SATURATION: 98 % | HEART RATE: 87 BPM | SYSTOLIC BLOOD PRESSURE: 118 MMHG

## 2023-05-24 DIAGNOSIS — Z00.00 ROUTINE GENERAL MEDICAL EXAMINATION AT A HEALTH CARE FACILITY: ICD-10-CM

## 2023-05-24 DIAGNOSIS — E78.49 OTHER HYPERLIPIDEMIA: Primary | ICD-10-CM

## 2023-05-24 DIAGNOSIS — E11.9 DIABETES MELLITUS WITHOUT COMPLICATION: ICD-10-CM

## 2023-05-24 DIAGNOSIS — I10 ESSENTIAL HYPERTENSION: ICD-10-CM

## 2023-05-24 LAB
ALBUMIN SERPL-MCNC: 4.8 G/DL (ref 3.5–5.2)
ALBUMIN UR-MCNC: <1.2 MG/DL
ALBUMIN/GLOB SERPL: 2 G/DL
ALP SERPL-CCNC: 79 U/L (ref 39–117)
ALT SERPL W P-5'-P-CCNC: 28 U/L (ref 1–41)
ANION GAP SERPL CALCULATED.3IONS-SCNC: 13 MMOL/L (ref 5–15)
AST SERPL-CCNC: 23 U/L (ref 1–40)
BASOPHILS # BLD AUTO: 0.02 10*3/MM3 (ref 0–0.2)
BASOPHILS NFR BLD AUTO: 0.4 % (ref 0–1.5)
BILIRUB SERPL-MCNC: 0.7 MG/DL (ref 0–1.2)
BUN SERPL-MCNC: 22 MG/DL (ref 6–20)
BUN/CREAT SERPL: 20.8 (ref 7–25)
CALCIUM SPEC-SCNC: 9.5 MG/DL (ref 8.6–10.5)
CHLORIDE SERPL-SCNC: 99 MMOL/L (ref 98–107)
CHOLEST SERPL-MCNC: 111 MG/DL (ref 0–200)
CO2 SERPL-SCNC: 25 MMOL/L (ref 22–29)
CREAT SERPL-MCNC: 1.06 MG/DL (ref 0.76–1.27)
CREAT UR-MCNC: 133.7 MG/DL
DEPRECATED RDW RBC AUTO: 42 FL (ref 37–54)
EGFRCR SERPLBLD CKD-EPI 2021: 82.9 ML/MIN/1.73
EOSINOPHIL # BLD AUTO: 0 10*3/MM3 (ref 0–0.4)
EOSINOPHIL NFR BLD AUTO: 0 % (ref 0.3–6.2)
ERYTHROCYTE [DISTWIDTH] IN BLOOD BY AUTOMATED COUNT: 12.6 % (ref 12.3–15.4)
GLOBULIN UR ELPH-MCNC: 2.4 GM/DL
GLUCOSE SERPL-MCNC: 126 MG/DL (ref 65–99)
HCT VFR BLD AUTO: 44.7 % (ref 37.5–51)
HDLC SERPL-MCNC: 47 MG/DL (ref 40–60)
HGB BLD-MCNC: 15.4 G/DL (ref 13–17.7)
IMM GRANULOCYTES # BLD AUTO: 0.01 10*3/MM3 (ref 0–0.05)
IMM GRANULOCYTES NFR BLD AUTO: 0.2 % (ref 0–0.5)
LDLC SERPL CALC-MCNC: 54 MG/DL (ref 0–100)
LDLC/HDLC SERPL: 1.22 {RATIO}
LYMPHOCYTES # BLD AUTO: 1.42 10*3/MM3 (ref 0.7–3.1)
LYMPHOCYTES NFR BLD AUTO: 28.2 % (ref 19.6–45.3)
MCH RBC QN AUTO: 31.2 PG (ref 26.6–33)
MCHC RBC AUTO-ENTMCNC: 34.5 G/DL (ref 31.5–35.7)
MCV RBC AUTO: 90.7 FL (ref 79–97)
MICROALBUMIN/CREAT UR: NORMAL MG/G{CREAT}
MONOCYTES # BLD AUTO: 0.3 10*3/MM3 (ref 0.1–0.9)
MONOCYTES NFR BLD AUTO: 6 % (ref 5–12)
NEUTROPHILS NFR BLD AUTO: 3.28 10*3/MM3 (ref 1.7–7)
NEUTROPHILS NFR BLD AUTO: 65.2 % (ref 42.7–76)
PLATELET # BLD AUTO: 204 10*3/MM3 (ref 140–450)
PMV BLD AUTO: 8.2 FL (ref 6–12)
POTASSIUM SERPL-SCNC: 4.2 MMOL/L (ref 3.5–5.2)
PROT SERPL-MCNC: 7.2 G/DL (ref 6–8.5)
RBC # BLD AUTO: 4.93 10*6/MM3 (ref 4.14–5.8)
SODIUM SERPL-SCNC: 137 MMOL/L (ref 136–145)
TRIGL SERPL-MCNC: 34 MG/DL (ref 0–150)
TSH SERPL DL<=0.05 MIU/L-ACNC: 1.4 UIU/ML (ref 0.27–4.2)
VLDLC SERPL-MCNC: 10 MG/DL (ref 5–40)
WBC NRBC COR # BLD: 5.03 10*3/MM3 (ref 3.4–10.8)

## 2023-05-24 PROCEDURE — 82570 ASSAY OF URINE CREATININE: CPT

## 2023-05-24 PROCEDURE — 82043 UR ALBUMIN QUANTITATIVE: CPT

## 2023-05-24 PROCEDURE — 36415 COLL VENOUS BLD VENIPUNCTURE: CPT

## 2023-05-24 PROCEDURE — 80050 GENERAL HEALTH PANEL: CPT

## 2023-05-24 PROCEDURE — 80061 LIPID PANEL: CPT | Performed by: NURSE PRACTITIONER

## 2023-05-24 RX ORDER — LISINOPRIL 5 MG/1
5 TABLET ORAL DAILY
Qty: 90 TABLET | Refills: 1 | Status: SHIPPED | OUTPATIENT
Start: 2023-05-24

## 2023-05-24 NOTE — ASSESSMENT & PLAN NOTE
To work on diet, regular exercise, monitor sugars, check feet daily, set up optometrist appt, keep follow up with

## 2023-05-24 NOTE — PROGRESS NOTES
Cholo BRUSH Darleen presents to [facility].    Chief Complaint:  Annual Exam         History of Present Illness:  General Health Questions  Regular exercise as least 3 days a week: yes   Follows a healthy diet: yes   Wears seat belt always: yes   Drinks Alcohol: rarely   Uses Recreational drugs: no   Uses tobacco products: none   UTD on Tetanus vaccine: 3-2016  Last colon screen: 2018    Hyperlipidemia  Current medication: lipitor   Tolerating medication: Yes  Needs Refill: Yes    Lab Results       Component                Value               Date                       CHOL                     147                 11/28/2022                 CHLPL                    113                 05/23/2020                 TRIG                     81                  11/28/2022                 HDL                      41                  11/28/2022                 LDL                      90                  11/28/2022              Hypertension:  Current medication: lisinopril   Tolerating Medication: yes  Checking BP at home and it is: same as here when he checks   Needs refills: Yes to [facility]   Labs:  Lab Results       Component                Value               Date                       GLUCOSE                  137 (H)             11/28/2022                 BUN                      21 (H)              11/28/2022                 CREATININE               0.98                11/28/2022                 EGFRIFNONA               88                  07/01/2021                 BCR                      21.4                11/28/2022                 K                        4.7                 11/28/2022                 CO2                      27.0                11/28/2022                 CALCIUM                  9.8                 11/28/2022                 ALBUMIN                  4.90                11/28/2022                 LABIL2                   1.8                 05/23/2020                 AST                       12                  2022                 ALT                      32                  2022              Diabetes:  Petra Bennett  Current medication: jardiance, metformin and monjerno   He has intentionally lost weight > 15 pounds   Tolerating medication: Yes and is taking a diabetes vitamin supplement OTC  Last eye exam: due, petra Woody   Last foot exam: > 1 year   At home BS ranges: 120-130  Lab Results       Component                Value               Date                       HGBA1C                   6.6                 2023                PAST MEDICAL HISTORY changes since :       Skin cancer,  Dg & treated earlier this year , face   HTN/ HLD     Type 2 Diabetes     basal skin cancer left side of face / an left arm and left scapula   [facility]         PREVENTIVE HEALTH MAINTENANCE             COLORECTAL CANCER SCREENING: Up to date (colonoscopy q10y; sigmoidoscopy q5y; Cologuard q3y) was last done 10-26-18, Results are in chart; colonoscopy with the following abnormalities noted-- spastic colon/ marginal prep         Surgical History:       PE tubes as a child     Procedures:    Colonoscopy (  )         Family History:     Father: Coronary Artery Disease     Mother: Coronary Artery Disease;  Type 2 Diabetes  76/ renal failure     Brother(s): 2 brother(s) total; 1 ;  Lymphoma ( non Hodgkins at 19 y/o )     Sister(s): 1 sister(s) total;  Anxiety     Son(s): Healthy; 1 son(s) total     Daughter(s): Healthy; 2 daughter(s) total     Paternal Grandfather:  at age 60's; Cause of death was cancer     Paternal Grandmother:  at age 80's     Maternal Grandfather:  at age 80's     Maternal Grandmother:  at age 80's;  Type 2 Diabetes         Social History:     Occupation: [employer]     Marital Status:      Children: 3 children                      Review of Systems:  Review of Systems   Constitutional: Negative for  "fatigue and fever.   HENT: Positive for hearing loss (mild while watching TV ). Negative for ear pain and sore throat.    Eyes: Negative for blurred vision.   Respiratory: Negative for cough and shortness of breath.    Cardiovascular: Negative for chest pain, palpitations and leg swelling.   Gastrointestinal: Negative for abdominal pain, constipation, diarrhea, nausea and vomiting.   Genitourinary: Negative for difficulty urinating.   Musculoskeletal: Negative for arthralgias and myalgias.   Skin: Negative for rash.   Neurological: Negative for dizziness, weakness and headache.   Psychiatric/Behavioral: Negative for sleep disturbance and depressed mood.          Current Outpatient Medications:   •  atorvastatin (LIPITOR) 20 MG tablet, Take 1 tablet BY MOUTH EVERY NIGHT AT BEDTIME, Disp: 90 tablet, Rfl: 1  •  Jardiance 25 MG tablet tablet, Take 1 tablet BY MOUTH EVERY DAY, Disp: 30 tablet, Rfl: 5  •  lisinopril (PRINIVIL,ZESTRIL) 5 MG tablet, Take 1 tablet by mouth Daily., Disp: 90 tablet, Rfl: 1  •  metFORMIN ER (GLUCOPHAGE-XR) 500 MG 24 hr tablet, Take 2 tablets by mouth 2 (Two) Times a Day., Disp: 360 tablet, Rfl: 1  •  NON FORMULARY / PATIENT SUPPLIED MEDICATION, ALTI Balance Supplement, Disp: , Rfl:   •  Tirzepatide (Mounjaro) 10 MG/0.5ML solution pen-injector, Inject 0.5 mL under the skin into the appropriate area as directed Every 7 (Seven) Days., Disp: 6 mL, Rfl: 1  •  vitamin D3 125 MCG (5000 UT) capsule capsule, Take 1 capsule by mouth Daily., Disp: , Rfl:     Vital Signs: 81.3 cm (2' 8\") waist circumference   Vitals:    05/24/23 0822   BP: 118/71   BP Location: Left arm   Patient Position: Sitting   Pulse: 87   SpO2: 98%  Comment: on room air   Weight: 76.5 kg (168 lb 9.6 oz)   Height: 180.3 cm (71\")         Physical Exam:  Physical Exam  Vitals reviewed.   Constitutional:       Appearance: Normal appearance. He is well-developed.   HENT:      Head: Normocephalic and atraumatic.      Right Ear: External " ear normal.      Left Ear: There is impacted cerumen (paritally obscured with cerumen ).      Ears:      Comments: Scarring of right TM, left slightly visible, no abnormalities noted     Mouth/Throat:      Pharynx: No oropharyngeal exudate.   Eyes:      Conjunctiva/sclera: Conjunctivae normal.      Pupils: Pupils are equal, round, and reactive to light.   Neck:      Vascular: No carotid bruit.   Cardiovascular:      Rate and Rhythm: Normal rate and regular rhythm.      Pulses:           Posterior tibial pulses are 2+ on the right side and 2+ on the left side.      Heart sounds: No murmur heard.    No friction rub. No gallop.   Pulmonary:      Effort: Pulmonary effort is normal. No respiratory distress.      Breath sounds: Normal breath sounds. No wheezing or rhonchi.   Abdominal:      General: Bowel sounds are normal. There is no distension.      Palpations: Abdomen is soft.      Tenderness: There is no abdominal tenderness.   Musculoskeletal:         General: No swelling.   Feet:      Right foot:      Protective Sensation: 3 sites tested. 3 sites sensed.      Skin integrity: Skin integrity normal. No ulcer or blister.      Toenail Condition: Right toenails are abnormally thick.      Left foot:      Protective Sensation: 3 sites tested. 3 sites sensed.      Skin integrity: Skin integrity normal. No ulcer or blister.      Toenail Condition: Left toenails are abnormally thick.      Comments: Diabetic Foot Exam Performed and Monofilament Test Performed     Skin:     General: Skin is warm and dry.   Neurological:      Mental Status: He is alert and oriented to person, place, and time.      Cranial Nerves: No cranial nerve deficit.   Psychiatric:         Mood and Affect: Mood and affect normal.         Behavior: Behavior normal.         Thought Content: Thought content normal.         Judgment: Judgment normal.         Result Review      The following data was reviewed by: ODETTE Avila on  05/24/2023:    Results for orders placed or performed in visit on 05/12/23   POC Glycosylated Hemoglobin (Hb A1C)    Specimen: Blood   Result Value Ref Range    Hemoglobin A1C 6.6 %    Lot Number [id number]     Expiration Date 3/31/25                Assessment and Plan:          Diagnoses and all orders for this visit:    1. Other hyperlipidemia (Primary)  Assessment & Plan:  Continue current medication and efforts with diet and exercise.       Orders:  -     Cancel: Comprehensive Metabolic Panel  -     Lipid Panel    2. Essential hypertension  Assessment & Plan:  Hypertension is stable. Continue to monitor BP at home. Continue current meds. Continue to modify diet and lifestyle.     Orders:  -     Cancel: Comprehensive Metabolic Panel  -     lisinopril (PRINIVIL,ZESTRIL) 5 MG tablet; Take 1 tablet by mouth Daily.  Dispense: 90 tablet; Refill: 1  -     Comprehensive Metabolic Panel; Future    3. Routine general medical examination at a health care facility  Assessment & Plan:  Follow a healthy diet and get regular exercise.  Always wear seat belts.    Advised covid vaccine, declines, advised shingrex, to check with coverage at his pharmacy     Orders:  -     Cancel: Comprehensive Metabolic Panel  -     Lipid Panel  -     CBC w AUTO Differential; Future  -     TSH Rfx On Abnormal To Free T4    4. Diabetes mellitus without complication  Assessment & Plan:  To work on diet, regular exercise, monitor sugars, check feet daily, set up optometrist appt, keep follow up with     Orders:  -     Comprehensive Metabolic Panel; Future  -     Microalbumin / Creatinine Urine Ratio - Urine, Clean Catch; Future        Follow Up   Return for followup pending lab results.  Patient was given instructions and counseling regarding his condition or for health maintenance advice. Please see specific information pulled into the AVS if appropriate.

## 2023-05-24 NOTE — ASSESSMENT & PLAN NOTE
Follow a healthy diet and get regular exercise.  Always wear seat belts.    Advised covid vaccine, declines, advised shingrex, to check with coverage at his pharmacy

## 2023-06-02 DIAGNOSIS — I10 ESSENTIAL HYPERTENSION: ICD-10-CM

## 2023-06-02 RX ORDER — LISINOPRIL 5 MG/1
TABLET ORAL
Qty: 90 TABLET | Refills: 1 | OUTPATIENT
Start: 2023-06-02

## 2023-08-18 DIAGNOSIS — E78.49 OTHER HYPERLIPIDEMIA: ICD-10-CM

## 2023-08-18 RX ORDER — ATORVASTATIN CALCIUM 20 MG/1
TABLET, FILM COATED ORAL
Qty: 90 TABLET | Refills: 1 | Status: SHIPPED | OUTPATIENT
Start: 2023-08-18

## 2023-09-28 NOTE — PROGRESS NOTES
Chief Complaint  Diabetes (Follow up, med mgt, A1c eval )    Referred By: No ref. provider found    Subjective          Cholo Morejon presents to [facility] for diabetes medication management    History of Present Illness    Visit type:  follow-up  Diabetes type:  Type 2  Current diabetes status/concerns/issues:  No complaints regarding his diabetes.  He has stopped taking the metformin  Other health concerns: no new health concerns  Current Diabetes symptoms:    Polyuria: No   Polydipsia: No   Polyphagia: No   Blurred vision: No   Excessive fatigue: No  Known Diabetes complications:  Neuropathy: None; Location: N/A  Renal: None  Eyes: None; Location: N/A  Amputation/Wounds: None  GI: None  Cardiovascular: Hypertension and Hyperlipidemia  ED: None  Other: None  Hypoglycemia:  None reported at this time  Hypoglycemia Symptoms:  No hypoglycemia at this time  Current diabetes treatment:  Jardiance 25 mg once a day and Mounjaro 10 mg once weekly.  He also uses an OTC supplement called ALTAI Balance Glucose Control     Blood glucose device:  Meter  Blood glucose monitoring frequency:  1 - 2  Blood glucose range/average:   120-140 in the mornings and low 100s in the days  Glucose Source: Patient Reported  Diet:  Limits high carb/sweet foods, Avoids sugary drinks  Activity/Exercise:   active with work    Past Medical History:   Diagnosis Date    Hx of skin cancer, basal cell     Hyperlipidemia     Hypertension     Type 2 diabetes mellitus      Past Surgical History:   Procedure Laterality Date    SKIN CANCER EXCISION       Family History   Problem Relation Age of Onset    Diabetes type II Other     Heart disease Other     Stroke Other     No Known Problems Sister     Cancer Brother         non hodgkin's lymphoma    Diabetes Maternal Grandmother     Lung cancer Maternal Grandfather     No Known Problems Paternal Grandmother     No Known Problems Paternal Grandfather     Coronary artery  "disease Mother         stent placement    Coronary artery disease Father         triple bypass     Social History     Socioeconomic History    Marital status:    Tobacco Use    Smoking status: Never    Smokeless tobacco: Never   Vaping Use    Vaping Use: Never used   Substance and Sexual Activity    Alcohol use: Yes     Comment: occasionally    Drug use: Never    Sexual activity: Defer     No Known Allergies    Current Outpatient Medications:     atorvastatin (LIPITOR) 20 MG tablet, TAKE 1 TABLET BY MOUTH EVERY NIGHT AT BEDTIME, Disp: 90 tablet, Rfl: 1    empagliflozin (Jardiance) 25 MG tablet tablet, Take 1 tablet by mouth Daily., Disp: 30 tablet, Rfl: 5    lisinopril (PRINIVIL,ZESTRIL) 5 MG tablet, Take 1 tablet by mouth Daily., Disp: 90 tablet, Rfl: 1    NON FORMULARY / PATIENT SUPPLIED MEDICATION, ALTI Balance Supplement, Disp: , Rfl:     Tirzepatide (Mounjaro) 10 MG/0.5ML solution pen-injector, Inject 0.5 mL under the skin into the appropriate area as directed Every 7 (Seven) Days., Disp: 6 mL, Rfl: 1    vitamin D3 125 MCG (5000 UT) capsule capsule, Take 1 capsule by mouth Daily., Disp: , Rfl:     Objective     Vitals:    09/29/23 1433   BP: 98/63   BP Location: Right arm   Patient Position: Sitting   Cuff Size: Adult   Pulse: 77   SpO2: 100%   Weight: 74.4 kg (164 lb)   Height: 180.3 cm (71\")   PainSc: 0-No pain     Body mass index is 22.87 kg/m².    Physical Exam  Constitutional:       Appearance: Normal appearance. He is normal weight.   HENT:      Head: Normocephalic and atraumatic.      Right Ear: External ear normal.      Left Ear: External ear normal.      Nose: Nose normal.   Eyes:      Extraocular Movements: Extraocular movements intact.      Conjunctiva/sclera: Conjunctivae normal.   Pulmonary:      Effort: Pulmonary effort is normal.   Musculoskeletal:         General: Normal range of motion.      Cervical back: Normal range of motion.   Skin:     General: Skin is warm and dry.   Neurological: "      General: No focal deficit present.      Mental Status: He is alert and oriented to person, place, and time. Mental status is at baseline.   Psychiatric:         Mood and Affect: Mood normal.         Behavior: Behavior normal.         Thought Content: Thought content normal.         Judgment: Judgment normal.           Result Review :   The following data was reviewed by: ODETTE Yoon on 09/29/2023:    Most Recent A1C          9/29/2023    14:34   HGBA1C Most Recent   Hemoglobin A1C 6.4        A1C Last 3 Results          1/13/2023    13:44 5/12/2023    13:05 9/29/2023    14:34   HGBA1C Last 3 Results   Hemoglobin A1C 7.4  6.6  6.4      A1c collected in the office today is 6.4%, indicating Controlled Type II diabetes.  This result is down from the prior result of 6.6% collected on 5/12/23     Creatinine   Date Value Ref Range Status   05/24/2023 1.06 0.76 - 1.27 mg/dL Final   11/28/2022 0.98 0.76 - 1.27 mg/dL Final     eGFR   Date Value Ref Range Status   05/24/2023 82.9 >60.0 mL/min/1.73 Final   11/28/2022 91.1 >60.0 mL/min/1.73 Final     Comment:     National Kidney Foundation and American Society of Nephrology (ASN) Task Force recommended calculation based on the Chronic Kidney Disease Epidemiology Collaboration (CKD-EPI) equation refit without adjustment for race.     Labs collected on 5/24/23 show Stage II mild (GFR = 60-89mL/min)    Microalbumin, Urine   Date Value Ref Range Status   05/24/2023 <1.2 mg/dL Final   05/19/2022 <1.2 mg/dL Final     Urine microalbuminuria collected on 5/24/23 is negative for microalbuminuria    Total Cholesterol   Date Value Ref Range Status   05/24/2023 111 0 - 200 mg/dL Final   11/28/2022 147 0 - 200 mg/dL Final     Triglycerides   Date Value Ref Range Status   05/24/2023 34 0 - 150 mg/dL Final   11/28/2022 81 0 - 150 mg/dL Final     HDL Cholesterol   Date Value Ref Range Status   05/24/2023 47 40 - 60 mg/dL Final   11/28/2022 41 40 - 60 mg/dL Final     LDL  Cholesterol    Date Value Ref Range Status   05/24/2023 54 0 - 100 mg/dL Final   11/28/2022 90 0 - 100 mg/dL Final     Lipid panel collected on 5/24/23 shows normal lipid panel            Assessment: His A1c remains well controlled without problematic hypoglycemia.  He has had a total of 21 pounds weight loss since January.  He is BMI is within normal limits currently.      Diagnoses and all orders for this visit:    1. Controlled type 2 diabetes mellitus without complication, without long-term current use of insulin (Primary)  -     POC Glycosylated Hemoglobin (Hb A1C)        Plan: We made no changes to his treatment plan.  The patient was cautioned if he continues to have weight loss we may need to decrease his Mounjaro in the future.  He will be scheduled for routine follow-up appointment    The patient will monitor his blood glucose levels 2-3 times each day.  If he develops problematic hyperglycemia or hypoglycemia or adverse drug reactions, he will contact the office for further instructions.        Follow Up     No follow-ups on file.    Patient was given instructions and counseling regarding his condition or for health maintenance advice. Please see specific information pulled into the AVS if appropriate.     Kiley Bennett, APRN  09/29/2023      Dictated Utilizing Dragon Dictation.  Please note that portions of this note were completed with a voice recognition program.  Part of this note may be an electronic transcription/translation of spoken language to printed text using the Dragon Dictation System.

## 2023-09-29 ENCOUNTER — OFFICE VISIT (OUTPATIENT)
Dept: DIABETES SERVICES | Facility: CLINIC | Age: 58
End: 2023-09-29
Payer: COMMERCIAL

## 2023-09-29 VITALS
BODY MASS INDEX: 22.96 KG/M2 | WEIGHT: 164 LBS | DIASTOLIC BLOOD PRESSURE: 63 MMHG | SYSTOLIC BLOOD PRESSURE: 98 MMHG | HEIGHT: 71 IN | OXYGEN SATURATION: 100 % | HEART RATE: 77 BPM

## 2023-09-29 DIAGNOSIS — E11.9 CONTROLLED TYPE 2 DIABETES MELLITUS WITHOUT COMPLICATION, WITHOUT LONG-TERM CURRENT USE OF INSULIN: Primary | ICD-10-CM

## 2023-09-29 LAB
EXPIRATION DATE: ABNORMAL
HBA1C MFR BLD: 6.4 %
Lab: ABNORMAL

## 2023-11-21 ENCOUNTER — LAB (OUTPATIENT)
Dept: LAB | Facility: HOSPITAL | Age: 58
End: 2023-11-21
Payer: COMMERCIAL

## 2023-11-21 ENCOUNTER — OFFICE VISIT (OUTPATIENT)
Dept: FAMILY MEDICINE CLINIC | Age: 58
End: 2023-11-21
Payer: COMMERCIAL

## 2023-11-21 VITALS
SYSTOLIC BLOOD PRESSURE: 116 MMHG | TEMPERATURE: 97.6 F | DIASTOLIC BLOOD PRESSURE: 71 MMHG | HEIGHT: 71 IN | BODY MASS INDEX: 24.08 KG/M2 | HEART RATE: 95 BPM | WEIGHT: 172 LBS

## 2023-11-21 DIAGNOSIS — I10 ESSENTIAL HYPERTENSION: ICD-10-CM

## 2023-11-21 DIAGNOSIS — E11.9 DIABETES MELLITUS WITHOUT COMPLICATION: Primary | ICD-10-CM

## 2023-11-21 DIAGNOSIS — E78.49 OTHER HYPERLIPIDEMIA: ICD-10-CM

## 2023-11-21 LAB
ALBUMIN SERPL-MCNC: 5 G/DL (ref 3.5–5.2)
ALBUMIN/GLOB SERPL: 2.1 G/DL
ALP SERPL-CCNC: 90 U/L (ref 39–117)
ALT SERPL W P-5'-P-CCNC: 43 U/L (ref 1–41)
ANION GAP SERPL CALCULATED.3IONS-SCNC: 10.8 MMOL/L (ref 5–15)
AST SERPL-CCNC: 25 U/L (ref 1–40)
BILIRUB SERPL-MCNC: 0.6 MG/DL (ref 0–1.2)
BUN SERPL-MCNC: 17 MG/DL (ref 6–20)
BUN/CREAT SERPL: 17.3 (ref 7–25)
CALCIUM SPEC-SCNC: 9.8 MG/DL (ref 8.6–10.5)
CHLORIDE SERPL-SCNC: 100 MMOL/L (ref 98–107)
CHOLEST SERPL-MCNC: 130 MG/DL (ref 0–200)
CO2 SERPL-SCNC: 26.2 MMOL/L (ref 22–29)
CREAT SERPL-MCNC: 0.98 MG/DL (ref 0.76–1.27)
EGFRCR SERPLBLD CKD-EPI 2021: 90.5 ML/MIN/1.73
GLOBULIN UR ELPH-MCNC: 2.4 GM/DL
GLUCOSE SERPL-MCNC: 111 MG/DL (ref 65–99)
HDLC SERPL-MCNC: 47 MG/DL (ref 40–60)
LDLC SERPL CALC-MCNC: 72 MG/DL (ref 0–100)
LDLC/HDLC SERPL: 1.56 {RATIO}
POTASSIUM SERPL-SCNC: 4.6 MMOL/L (ref 3.5–5.2)
PROT SERPL-MCNC: 7.4 G/DL (ref 6–8.5)
SODIUM SERPL-SCNC: 137 MMOL/L (ref 136–145)
TRIGL SERPL-MCNC: 49 MG/DL (ref 0–150)
VLDLC SERPL-MCNC: 11 MG/DL (ref 5–40)

## 2023-11-21 PROCEDURE — 36415 COLL VENOUS BLD VENIPUNCTURE: CPT | Performed by: NURSE PRACTITIONER

## 2023-11-21 PROCEDURE — 80053 COMPREHEN METABOLIC PANEL: CPT | Performed by: NURSE PRACTITIONER

## 2023-11-21 PROCEDURE — 80061 LIPID PANEL: CPT | Performed by: NURSE PRACTITIONER

## 2023-11-21 RX ORDER — LISINOPRIL 5 MG/1
5 TABLET ORAL DAILY
Qty: 90 TABLET | Refills: 1 | Status: SHIPPED | OUTPATIENT
Start: 2023-11-21

## 2023-11-21 NOTE — ASSESSMENT & PLAN NOTE
Hypertension is stable. to monitor BP at home. To continue current med. Continue to modify diet and lifestyle.

## 2023-11-21 NOTE — ASSESSMENT & PLAN NOTE
Continue current medication and efforts with diet and exercise.   He is fasting, checking labs today

## 2023-11-21 NOTE — PROGRESS NOTES
Chief Complaint  Diabetes (6 month follow up for diabetes, HTN, HLD. )    Subjective          Cholo Morejon presents to [facility]    History of Present Illness  Diabetes:  Sees NIKHIL meneses  Current medication: jardiance and monjerno (was able to get off metformin)   Tolerating medication: Yes  Last eye exam: planning on seeing Dr Armando 1-2024  Last foot exam: 5-2023  At home BS ranges: 120-130 or less   Lab Results       Component                Value               Date                       HGBA1C                   6.4 (A)             09/29/2023                Hyperlipidemia  Current medication: lipitor   Tolerating medication: yes  Needs Refill: no     Lab Results       Component                Value               Date                       CHOL                     111                 05/24/2023                 CHLPL                    113                 05/23/2020                 TRIG                     34                  05/24/2023                 HDL                      47                  05/24/2023                 LDL                      54                  05/24/2023                Hypertension:  Current medication: lisinopril   Tolerating Medication: Yes  Checking BP at home and it is: not checking   Needs refills: Yes to [facility]  Labs:  Lab Results       Component                Value               Date                       GLUCOSE                  126 (H)             05/24/2023                 BUN                      22 (H)              05/24/2023                 CREATININE               1.06                05/24/2023                 EGFRIFNONA               88                  07/01/2021                 BCR                      20.8                05/24/2023                 K                        4.2                 05/24/2023                 CO2                      25.0                05/24/2023                 CALCIUM                  9.5                  2023                 ALBUMIN                  4.8                 2023                 LABIL2                   1.8                 2020                 AST                      23                  2023                 ALT                      28                  2023              PAST MEDICAL HISTORY changes since :       Skin cancer,  Dg & treated earlier this year , face   HTN/ HLD     Type 2 Diabetes     basal skin cancer left side of face / an left arm and left scapula   [facility]         PREVENTIVE HEALTH MAINTENANCE             COLORECTAL CANCER SCREENING: Up to date (colonoscopy q10y; sigmoidoscopy q5y; Cologuard q3y) was last done 10-26-18, Results are in chart; colonoscopy with the following abnormalities noted-- spastic colon/ marginal prep         Surgical History:       PE tubes as a child     Procedures:    Colonoscopy (  )         Family History:     Father: Coronary Artery Disease     Mother: Coronary Artery Disease;  Type 2 Diabetes  76/ renal failure     Brother(s): 2 brother(s) total; 1 ;  Lymphoma ( non Hodgkins at 17 y/o )     Sister(s): 1 sister(s) total;  Anxiety     Son(s): Healthy; 1 son(s) total     Daughter(s): Healthy; 2 daughter(s) total     Paternal Grandfather:  at age 60's; Cause of death was cancer     Paternal Grandmother:  at age 80's     Maternal Grandfather:  at age 80's     Maternal Grandmother:  at age 80's;  Type 2 Diabetes         Social History:     Occupation: [employer] /[employer]     Marital Status:      Children: 3 children                       Past Medical History:   Diagnosis Date    Hx of skin cancer, basal cell     Hyperlipidemia     Hypertension     Type 2 diabetes mellitus        No Known Allergies     Past Surgical History:   Procedure Laterality Date    SKIN CANCER EXCISION          Social History     Tobacco Use    Smoking status: Never     Passive exposure: Never    Smokeless  tobacco: Never   Substance Use Topics    Alcohol use: Yes     Comment: occasionally       Family History   Problem Relation Age of Onset    Diabetes type II Other     Heart disease Other     Stroke Other     No Known Problems Sister     Cancer Brother         non hodgkin's lymphoma    Diabetes Maternal Grandmother     Lung cancer Maternal Grandfather     No Known Problems Paternal Grandmother     No Known Problems Paternal Grandfather     Coronary artery disease Mother         stent placement    Coronary artery disease Father         triple bypass        Health Maintenance Due   Topic Date Due    Hepatitis B (1 of 3 - 3-dose series) Never done    Pneumococcal Vaccine 0-64 (1 - PCV) Never done    ZOSTER VACCINE (1 of 2) Never done    INFLUENZA VACCINE  Never done        Current Outpatient Medications on File Prior to Visit   Medication Sig    atorvastatin (LIPITOR) 20 MG tablet TAKE 1 TABLET BY MOUTH EVERY NIGHT AT BEDTIME    empagliflozin (Jardiance) 25 MG tablet tablet Take 1 tablet by mouth Daily.    NON FORMULARY / PATIENT SUPPLIED MEDICATION ALTI Balance Supplement    Tirzepatide (Mounjaro) 10 MG/0.5ML solution pen-injector Inject 0.5 mL under the skin into the appropriate area as directed Every 7 (Seven) Days.    vitamin D3 125 MCG (5000 UT) capsule capsule Take 1 capsule by mouth Daily.    [DISCONTINUED] lisinopril (PRINIVIL,ZESTRIL) 5 MG tablet Take 1 tablet by mouth Daily.     No current facility-administered medications on file prior to visit.       Immunization History   Administered Date(s) Administered    COVID-19 (PFIZER) Purple Cap Monovalent 05/28/2021, 06/18/2021    Tdap 03/23/2016       Review of Systems   Constitutional:  Negative for fatigue and fever.   Respiratory:  Negative for cough and shortness of breath.    Cardiovascular:  Negative for chest pain, palpitations and leg swelling.        Objective     Vitals:    11/21/23 0848   BP: 116/71   BP Location: Left arm   Patient Position: Sitting  "  Cuff Size: Adult   Pulse: 95   Temp: 97.6 °F (36.4 °C)   TempSrc: Oral   Weight: 78 kg (172 lb)   Height: 180.3 cm (71\")            Physical Exam  Vitals reviewed.   Constitutional:       General: He is not in acute distress.     Appearance: Normal appearance.   Neck:      Vascular: No carotid bruit.   Cardiovascular:      Rate and Rhythm: Normal rate and regular rhythm.      Heart sounds: Normal heart sounds. No murmur heard.  Pulmonary:      Effort: Pulmonary effort is normal. No respiratory distress.      Breath sounds: Normal breath sounds.   Musculoskeletal:      Right lower leg: No edema.      Left lower leg: No edema.   Neurological:      Mental Status: He is alert.   Psychiatric:         Mood and Affect: Mood normal.         Behavior: Behavior normal.         Result Review :     The following data was reviewed by: ODETTE Avila on 11/21/2023:                       Assessment and Plan      Diagnoses and all orders for this visit:    1. Diabetes mellitus without complication (Primary)  Assessment & Plan:  Declines vaccines today, he is fasting, getting labs and will follow up with  .      2. Essential hypertension  Assessment & Plan:  Hypertension is stable. to monitor BP at home. To continue current med. Continue to modify diet and lifestyle.        Orders:  -     Comprehensive Metabolic Panel  -     Lipid Panel  -     lisinopril (PRINIVIL,ZESTRIL) 5 MG tablet; Take 1 tablet by mouth Daily.  Dispense: 90 tablet; Refill: 1    3. Other hyperlipidemia  Assessment & Plan:  Continue current medication and efforts with diet and exercise.   He is fasting, checking labs today     Orders:  -     Comprehensive Metabolic Panel  -     Lipid Panel        BMI is within normal parameters. No other follow-up for BMI required.         Follow Up     Return in about 6 months (around 5/21/2024) for followup pending lab results.    Patient was given instructions and counseling regarding his condition or for " health maintenance advice. Please see specific information pulled into the AVS if appropriate.

## 2023-11-27 DIAGNOSIS — R74.8 ELEVATED LIVER ENZYMES: Primary | ICD-10-CM

## 2023-12-01 DIAGNOSIS — I10 ESSENTIAL HYPERTENSION: ICD-10-CM

## 2023-12-06 DIAGNOSIS — E11.9 CONTROLLED TYPE 2 DIABETES MELLITUS WITHOUT COMPLICATION, WITHOUT LONG-TERM CURRENT USE OF INSULIN: ICD-10-CM

## 2023-12-06 RX ORDER — TIRZEPATIDE 10 MG/.5ML
INJECTION, SOLUTION SUBCUTANEOUS
Qty: 6 ML | Refills: 3 | Status: SHIPPED | OUTPATIENT
Start: 2023-12-06

## 2023-12-26 RX ORDER — EMPAGLIFLOZIN 25 MG/1
25 TABLET, FILM COATED ORAL DAILY
Qty: 30 TABLET | Refills: 5 | Status: SHIPPED | OUTPATIENT
Start: 2023-12-26

## 2024-02-15 DIAGNOSIS — E78.49 OTHER HYPERLIPIDEMIA: ICD-10-CM

## 2024-02-15 RX ORDER — ATORVASTATIN CALCIUM 20 MG/1
TABLET, FILM COATED ORAL
Qty: 90 TABLET | Refills: 1 | Status: SHIPPED | OUTPATIENT
Start: 2024-02-15

## 2024-02-26 ENCOUNTER — TELEPHONE (OUTPATIENT)
Dept: FAMILY MEDICINE CLINIC | Age: 59
End: 2024-02-26
Payer: COMMERCIAL

## 2024-03-05 ENCOUNTER — LAB (OUTPATIENT)
Dept: LAB | Facility: HOSPITAL | Age: 59
End: 2024-03-05
Payer: COMMERCIAL

## 2024-03-05 DIAGNOSIS — R74.8 ELEVATED LIVER ENZYMES: ICD-10-CM

## 2024-03-05 LAB
ALBUMIN SERPL-MCNC: 4.9 G/DL (ref 3.5–5.2)
ALBUMIN/GLOB SERPL: 2.2 G/DL
ALP SERPL-CCNC: 89 U/L (ref 39–117)
ALT SERPL W P-5'-P-CCNC: 33 U/L (ref 1–41)
ANION GAP SERPL CALCULATED.3IONS-SCNC: 8 MMOL/L (ref 5–15)
AST SERPL-CCNC: 19 U/L (ref 1–40)
BILIRUB SERPL-MCNC: 0.7 MG/DL (ref 0–1.2)
BUN SERPL-MCNC: 18 MG/DL (ref 6–20)
BUN/CREAT SERPL: 16.5 (ref 7–25)
CALCIUM SPEC-SCNC: 9.4 MG/DL (ref 8.6–10.5)
CHLORIDE SERPL-SCNC: 102 MMOL/L (ref 98–107)
CO2 SERPL-SCNC: 26 MMOL/L (ref 22–29)
CREAT SERPL-MCNC: 1.09 MG/DL (ref 0.76–1.27)
EGFRCR SERPLBLD CKD-EPI 2021: 79.7 ML/MIN/1.73
GLOBULIN UR ELPH-MCNC: 2.2 GM/DL
GLUCOSE SERPL-MCNC: 108 MG/DL (ref 65–99)
POTASSIUM SERPL-SCNC: 4.3 MMOL/L (ref 3.5–5.2)
PROT SERPL-MCNC: 7.1 G/DL (ref 6–8.5)
SODIUM SERPL-SCNC: 136 MMOL/L (ref 136–145)

## 2024-03-05 PROCEDURE — 80053 COMPREHEN METABOLIC PANEL: CPT

## 2024-03-05 PROCEDURE — 36415 COLL VENOUS BLD VENIPUNCTURE: CPT

## 2024-04-04 NOTE — PROGRESS NOTES
Chief Complaint  Diabetes (Follow up, med mgt, A1c eval )    Referred By: No ref. provider found    Subjective          Cholo Morejon presents to [facility] for diabetes medication management    History of Present Illness    Visit type:  follow-up  Diabetes type:  Type 2  Current diabetes status/concerns/issues:  No concerns with his diabetes today  Other health concerns: No new health concerns  Current Diabetes symptoms:    Polyuria: No   Polydipsia: No   Polyphagia: No   Blurred vision: No   Excessive fatigue: No  Known Diabetes complications:  Neuropathy: None; Location: N/A  Renal: None  Eyes: None; Location: N/A  Amputation/Wounds: None  GI: None  Cardiovascular: Hypertension and Hyperlipidemia  ED: None  Other: None  Hypoglycemia:  None reported at this time  Hypoglycemia Symptoms:  No hypoglycemia at this time  Current diabetes treatment:  Jardiance 25 mg once a day and Mounjaro 10 mg once weekly.  He also uses an OTC supplement called ALTAI Balance Glucose Control     Blood glucose device:  Meter  Blood glucose monitoring frequency:  1 - 2  Blood glucose range/average:   110-130 fasting;  later in the day  Glucose Source: Patient Reported  Diet:  Limits high carb/sweet foods, Avoids sugary drinks  Activity/Exercise:  None    Past Medical History:   Diagnosis Date    Hx of skin cancer, basal cell     Hyperlipidemia     Hypertension     Type 2 diabetes mellitus      Past Surgical History:   Procedure Laterality Date    SKIN CANCER EXCISION       Family History   Problem Relation Age of Onset    Diabetes type II Other     Heart disease Other     Stroke Other     No Known Problems Sister     Cancer Brother         non hodgkin's lymphoma    Diabetes Maternal Grandmother     Lung cancer Maternal Grandfather     No Known Problems Paternal Grandmother     No Known Problems Paternal Grandfather     Coronary artery disease Mother         stent placement    Coronary artery disease  "Father         triple bypass     Social History     Socioeconomic History    Marital status:    Tobacco Use    Smoking status: Never     Passive exposure: Never    Smokeless tobacco: Never   Vaping Use    Vaping status: Never Used   Substance and Sexual Activity    Alcohol use: Yes     Comment: occasionally    Drug use: Never    Sexual activity: Defer     No Known Allergies    Current Outpatient Medications:     atorvastatin (LIPITOR) 20 MG tablet, TAKE 1 TABLET BY MOUTH EVERY NIGHT AT BEDTIME, Disp: 90 tablet, Rfl: 1    Jardiance 25 MG tablet tablet, TAKE 1 TABLET BY MOUTH DAILY, Disp: 30 tablet, Rfl: 5    lisinopril (PRINIVIL,ZESTRIL) 5 MG tablet, Take 1 tablet by mouth Daily., Disp: 90 tablet, Rfl: 1    Mounjaro 10 MG/0.5ML solution pen-injector, INJECT THE CONTENTS OF ONE PEN  SUBCUTANEOUSLY WEEKLY AS  DIRECTED, Disp: 6 mL, Rfl: 3    NON FORMULARY / PATIENT SUPPLIED MEDICATION, ALTI Balance Supplement, Disp: , Rfl:     vitamin D3 125 MCG (5000 UT) capsule capsule, Take 1 capsule by mouth Daily., Disp: , Rfl:     Objective     Vitals:    04/12/24 1343   BP: 102/65   BP Location: Right arm   Patient Position: Sitting   Cuff Size: Adult   Pulse: 70   SpO2: 100%   Weight: 77.1 kg (170 lb)   Height: 179.1 cm (70.5\")   PainSc: 0-No pain     Body mass index is 24.05 kg/m².    Physical Exam  Constitutional:       Appearance: Normal appearance. He is normal weight.   HENT:      Head: Normocephalic and atraumatic.      Right Ear: External ear normal.      Left Ear: External ear normal.      Nose: Nose normal.   Eyes:      Extraocular Movements: Extraocular movements intact.      Conjunctiva/sclera: Conjunctivae normal.   Pulmonary:      Effort: Pulmonary effort is normal.   Musculoskeletal:         General: Normal range of motion.      Cervical back: Normal range of motion.   Skin:     General: Skin is warm and dry.   Neurological:      General: No focal deficit present.      Mental Status: He is alert and oriented " to person, place, and time. Mental status is at baseline.   Psychiatric:         Mood and Affect: Mood normal.         Behavior: Behavior normal.         Thought Content: Thought content normal.         Judgment: Judgment normal.             Result Review :   The following data was reviewed by: ODETTE Yoon on 04/12/2024:    Most Recent A1C          4/12/2024    13:49   HGBA1C Most Recent   Hemoglobin A1C 6.6        A1C Last 3 Results          5/12/2023    13:05 9/29/2023    14:34 4/12/2024    13:49   HGBA1C Last 3 Results   Hemoglobin A1C 6.6  6.4  6.6      A1c collected in the office today is 6.6%, indicating Controlled Type II diabetes.  This result is up from the prior result of 6.4% collected on 9/29/2023      Creatinine   Date Value Ref Range Status   03/05/2024 1.09 0.76 - 1.27 mg/dL Final   11/21/2023 0.98 0.76 - 1.27 mg/dL Final     eGFR   Date Value Ref Range Status   03/05/2024 79.7 >60.0 mL/min/1.73 Final   11/21/2023 90.5 >60.0 mL/min/1.73 Final     Labs collected on 3/5/2024 show Stage II mild (GFR = 60-89mL/min)          Assessment: He has had a slight increase in his A1c.  He continues to remain well-controlled without problematic hypoglycemia.  He is tolerating his medications without difficulty.  He reports his glucose levels are highest in the morning sometimes as high as 130 but the remainder of the day when he does a random test he will be in the  range.  This represents a broderick effect which is difficult to overcome without the use of insulin.  Since his glucose levels are leveling out throughout the day and his A1c is well-controlled this is not of concern.      Diagnoses and all orders for this visit:    1. Controlled type 2 diabetes mellitus without complication, without long-term current use of insulin (Primary)  -     POC Glycosylated Hemoglobin (Hb A1C)        Plan: No changes were made to the treatment plan today.  Patient be scheduled for routine follow-up  appointment.    The patient will monitor his blood glucose levels 1-2 times each day.  If he develops problematic hyperglycemia or hypoglycemia or adverse drug reactions, he will contact the office for further instructions.        Follow Up     Return in about 6 months (around 10/12/2024) for Medication Management.    Patient was given instructions and counseling regarding his condition or for health maintenance advice. Please see specific information pulled into the AVS if appropriate.     Kiley Bennett, ODETTE  04/12/2024      Dictated Utilizing Dragon Dictation.  Please note that portions of this note were completed with a voice recognition program.  Part of this note may be an electronic transcription/translation of spoken language to printed text using the Dragon Dictation System.

## 2024-04-12 ENCOUNTER — OFFICE VISIT (OUTPATIENT)
Dept: DIABETES SERVICES | Facility: CLINIC | Age: 59
End: 2024-04-12
Payer: COMMERCIAL

## 2024-04-12 VITALS
HEART RATE: 70 BPM | DIASTOLIC BLOOD PRESSURE: 65 MMHG | HEIGHT: 71 IN | OXYGEN SATURATION: 100 % | BODY MASS INDEX: 23.8 KG/M2 | WEIGHT: 170 LBS | SYSTOLIC BLOOD PRESSURE: 102 MMHG

## 2024-04-12 DIAGNOSIS — E11.9 CONTROLLED TYPE 2 DIABETES MELLITUS WITHOUT COMPLICATION, WITHOUT LONG-TERM CURRENT USE OF INSULIN: Primary | ICD-10-CM

## 2024-04-12 LAB
EXPIRATION DATE: ABNORMAL
HBA1C MFR BLD: 6.6 % (ref 4.5–5.7)
Lab: ABNORMAL

## 2024-04-12 PROCEDURE — 99213 OFFICE O/P EST LOW 20 MIN: CPT | Performed by: NURSE PRACTITIONER

## 2024-05-23 ENCOUNTER — OFFICE VISIT (OUTPATIENT)
Dept: FAMILY MEDICINE CLINIC | Age: 59
End: 2024-05-23
Payer: COMMERCIAL

## 2024-05-23 ENCOUNTER — LAB (OUTPATIENT)
Dept: LAB | Facility: HOSPITAL | Age: 59
End: 2024-05-23
Payer: COMMERCIAL

## 2024-05-23 VITALS
SYSTOLIC BLOOD PRESSURE: 97 MMHG | HEART RATE: 76 BPM | WEIGHT: 172.2 LBS | BODY MASS INDEX: 24.11 KG/M2 | DIASTOLIC BLOOD PRESSURE: 63 MMHG | HEIGHT: 71 IN

## 2024-05-23 DIAGNOSIS — E11.9 DIABETES MELLITUS WITHOUT COMPLICATION: ICD-10-CM

## 2024-05-23 DIAGNOSIS — Z00.00 ROUTINE GENERAL MEDICAL EXAMINATION AT A HEALTH CARE FACILITY: ICD-10-CM

## 2024-05-23 DIAGNOSIS — Z12.5 SCREENING FOR MALIGNANT NEOPLASM OF PROSTATE: Primary | ICD-10-CM

## 2024-05-23 DIAGNOSIS — E78.49 OTHER HYPERLIPIDEMIA: ICD-10-CM

## 2024-05-23 DIAGNOSIS — I10 ESSENTIAL HYPERTENSION: ICD-10-CM

## 2024-05-23 DIAGNOSIS — Z12.5 SCREENING FOR MALIGNANT NEOPLASM OF PROSTATE: ICD-10-CM

## 2024-05-23 LAB
ALBUMIN SERPL-MCNC: 4.8 G/DL (ref 3.5–5.2)
ALBUMIN UR-MCNC: <1.2 MG/DL
ALBUMIN/GLOB SERPL: 1.7 G/DL
ALP SERPL-CCNC: 93 U/L (ref 39–117)
ALT SERPL W P-5'-P-CCNC: 32 U/L (ref 1–41)
ANION GAP SERPL CALCULATED.3IONS-SCNC: 12.4 MMOL/L (ref 5–15)
AST SERPL-CCNC: 20 U/L (ref 1–40)
BASOPHILS # BLD AUTO: 0.01 10*3/MM3 (ref 0–0.2)
BASOPHILS NFR BLD AUTO: 0.2 % (ref 0–1.5)
BILIRUB SERPL-MCNC: 0.8 MG/DL (ref 0–1.2)
BUN SERPL-MCNC: 19 MG/DL (ref 6–20)
BUN/CREAT SERPL: 17 (ref 7–25)
CALCIUM SPEC-SCNC: 9.7 MG/DL (ref 8.6–10.5)
CHLORIDE SERPL-SCNC: 99 MMOL/L (ref 98–107)
CHOLEST SERPL-MCNC: 134 MG/DL (ref 0–200)
CO2 SERPL-SCNC: 24.6 MMOL/L (ref 22–29)
CREAT SERPL-MCNC: 1.12 MG/DL (ref 0.76–1.27)
CREAT UR-MCNC: 71.4 MG/DL
DEPRECATED RDW RBC AUTO: 40.8 FL (ref 37–54)
EGFRCR SERPLBLD CKD-EPI 2021: 77.1 ML/MIN/1.73
EOSINOPHIL # BLD AUTO: 0 10*3/MM3 (ref 0–0.4)
EOSINOPHIL NFR BLD AUTO: 0 % (ref 0.3–6.2)
ERYTHROCYTE [DISTWIDTH] IN BLOOD BY AUTOMATED COUNT: 12.3 % (ref 12.3–15.4)
GLOBULIN UR ELPH-MCNC: 2.9 GM/DL
GLUCOSE SERPL-MCNC: 114 MG/DL (ref 65–99)
HCT VFR BLD AUTO: 47.9 % (ref 37.5–51)
HDLC SERPL-MCNC: 48 MG/DL (ref 40–60)
HGB BLD-MCNC: 16.3 G/DL (ref 13–17.7)
IMM GRANULOCYTES # BLD AUTO: 0 10*3/MM3 (ref 0–0.05)
IMM GRANULOCYTES NFR BLD AUTO: 0 % (ref 0–0.5)
LDLC SERPL CALC-MCNC: 75 MG/DL (ref 0–100)
LDLC/HDLC SERPL: 1.6 {RATIO}
LYMPHOCYTES # BLD AUTO: 1.38 10*3/MM3 (ref 0.7–3.1)
LYMPHOCYTES NFR BLD AUTO: 27.9 % (ref 19.6–45.3)
MCH RBC QN AUTO: 30.7 PG (ref 26.6–33)
MCHC RBC AUTO-ENTMCNC: 34 G/DL (ref 31.5–35.7)
MCV RBC AUTO: 90.2 FL (ref 79–97)
MICROALBUMIN/CREAT UR: NORMAL MG/G{CREAT}
MONOCYTES # BLD AUTO: 0.24 10*3/MM3 (ref 0.1–0.9)
MONOCYTES NFR BLD AUTO: 4.9 % (ref 5–12)
NEUTROPHILS NFR BLD AUTO: 3.31 10*3/MM3 (ref 1.7–7)
NEUTROPHILS NFR BLD AUTO: 67 % (ref 42.7–76)
PLATELET # BLD AUTO: 202 10*3/MM3 (ref 140–450)
PMV BLD AUTO: 8.5 FL (ref 6–12)
POTASSIUM SERPL-SCNC: 4.6 MMOL/L (ref 3.5–5.2)
PROT SERPL-MCNC: 7.7 G/DL (ref 6–8.5)
PSA SERPL-MCNC: 0.28 NG/ML (ref 0–4)
RBC # BLD AUTO: 5.31 10*6/MM3 (ref 4.14–5.8)
SODIUM SERPL-SCNC: 136 MMOL/L (ref 136–145)
TRIGL SERPL-MCNC: 46 MG/DL (ref 0–150)
TSH SERPL DL<=0.05 MIU/L-ACNC: 2.08 UIU/ML (ref 0.27–4.2)
VLDLC SERPL-MCNC: 11 MG/DL (ref 5–40)
WBC NRBC COR # BLD AUTO: 4.94 10*3/MM3 (ref 3.4–10.8)

## 2024-05-23 PROCEDURE — 36415 COLL VENOUS BLD VENIPUNCTURE: CPT

## 2024-05-23 PROCEDURE — 80050 GENERAL HEALTH PANEL: CPT

## 2024-05-23 PROCEDURE — G0103 PSA SCREENING: HCPCS

## 2024-05-23 PROCEDURE — 82043 UR ALBUMIN QUANTITATIVE: CPT

## 2024-05-23 PROCEDURE — 82570 ASSAY OF URINE CREATININE: CPT

## 2024-05-23 PROCEDURE — 80061 LIPID PANEL: CPT

## 2024-05-23 PROCEDURE — 99396 PREV VISIT EST AGE 40-64: CPT | Performed by: NURSE PRACTITIONER

## 2024-05-23 RX ORDER — LISINOPRIL 5 MG/1
5 TABLET ORAL DAILY
Qty: 90 TABLET | Refills: 1 | Status: SHIPPED | OUTPATIENT
Start: 2024-05-23

## 2024-05-23 RX ORDER — LISINOPRIL 5 MG/1
5 TABLET ORAL DAILY
Qty: 90 TABLET | Refills: 1 | OUTPATIENT
Start: 2024-05-23

## 2024-05-23 NOTE — ASSESSMENT & PLAN NOTE
Advise regular exercise, healthy eating, always wear seat belts. Tetanus UTD   He is fasting today

## 2024-05-23 NOTE — PROGRESS NOTES
Chief Complaint  Annual Exam    Subjective          Cholo Morejon presents to [facility]    History of Present Illness  General Health Questions  Regular exercise as least 3 days a week: yes   Follows a healthy diet: yes   Wears seat belt always: yes   Drinks Alcohol: rarely   Uses Recreational drugs: none   Uses tobacco products: None   UTD on Tetanus vaccine: 3-  Last PSA: normal 5-2023  Last colon screen:2018    Hyperlipidemia  Current medication: lipitor   Tolerating medication: Yes  Needs Refill: no     Lab Results       Component                Value               Date                       CHOL                     130                 11/21/2023                 CHLPL                    113                 05/23/2020                 TRIG                     49                  11/21/2023                 HDL                      47                  11/21/2023                 LDL                      72                  11/21/2023               Sugars usually 140-160, sees V Paddy    Hypertension:  Current medication: lisinopril   Tolerating Medication: Yes  Checking BP at home and it is: not checking   Needs refills: Yes to [facility]  Labs:  Lab Results       Component                Value               Date                       GLUCOSE                  108 (H)             03/05/2024                 BUN                      18                  03/05/2024                 CREATININE               1.09                03/05/2024                 EGFRIFNONA               88                  07/01/2021                 BCR                      16.5                03/05/2024                 K                        4.3                 03/05/2024                 CO2                      26.0                03/05/2024                 CALCIUM                  9.4                 03/05/2024                 ALBUMIN                  4.9                 03/05/2024                 LABIL2                    1.8                 2020                 AST                      19                  2024                 ALT                      33                  2024              PAST MEDICAL HISTORY changes since 2023:       Skin cancer,  Dg & treated , face   HTN/ HLD     Type 2 Diabetes     basal skin cancer left side of face / an left arm and left scapula   [facility]         PREVENTIVE HEALTH MAINTENANCE             COLORECTAL CANCER SCREENING: Up to date (colonoscopy q10y; sigmoidoscopy q5y; Cologuard q3y) was last done 10-26-18, Results are in chart; colonoscopy with the following abnormalities noted-- spastic colon/ marginal prep         Surgical History:       PE tubes as a child     Procedures:    Colonoscopy (  )         Family History:       Father: Coronary Artery Disease     Mother: Coronary Artery Disease;  Type 2 Diabetes  76/ renal failure     Brother(s): 2 brother(s) total; 1 ;  Lymphoma ( non Hodgkins at 17 y/o )     Sister(s): 1 sister(s) total;  Anxiety     Son(s): Healthy; 1 son(s) total     Daughter(s): Healthy; 2 daughter(s) total     Paternal Grandfather:  at age 60's; Cause of death was cancer     Paternal Grandmother:  at age 80's     Maternal Grandfather:  at age 80's     Maternal Grandmother:  at age 80's;  Type 2 Diabetes         Social History:       Occupation: [employer]     Marital Status:      Children: 3 children                         Past Medical History:   Diagnosis Date    Hx of skin cancer, basal cell     Hyperlipidemia     Hypertension     Type 2 diabetes mellitus        No Known Allergies     Past Surgical History:   Procedure Laterality Date    SKIN CANCER EXCISION          Social History     Tobacco Use    Smoking status: Never     Passive exposure: Never    Smokeless tobacco: Never   Substance Use Topics    Alcohol use: Yes     Comment: occasionally       Family History   Problem  Relation Age of Onset    Diabetes type II Other     Heart disease Other     Stroke Other     No Known Problems Sister     Cancer Brother         non hodgkin's lymphoma    Diabetes Maternal Grandmother     Lung cancer Maternal Grandfather     No Known Problems Paternal Grandmother     No Known Problems Paternal Grandfather     Coronary artery disease Mother         stent placement    Coronary artery disease Father         triple bypass        Health Maintenance Due   Topic Date Due    Pneumococcal Vaccine 0-64 (1 of 2 - PCV) Never done    Hepatitis B (1 of 3 - 19+ 3-dose series) Never done    ZOSTER VACCINE (1 of 2) Never done    COVID-19 Vaccine (3 - 2023-24 season) 09/01/2023        Current Outpatient Medications on File Prior to Visit   Medication Sig    atorvastatin (LIPITOR) 20 MG tablet TAKE 1 TABLET BY MOUTH EVERY NIGHT AT BEDTIME    Jardiance 25 MG tablet tablet TAKE 1 TABLET BY MOUTH DAILY    Mounjaro 10 MG/0.5ML solution pen-injector INJECT THE CONTENTS OF ONE PEN  SUBCUTANEOUSLY WEEKLY AS  DIRECTED    NON FORMULARY / PATIENT SUPPLIED MEDICATION ALTI Balance Supplement    vitamin D3 125 MCG (5000 UT) capsule capsule Take 1 capsule by mouth Daily.    [DISCONTINUED] lisinopril (PRINIVIL,ZESTRIL) 5 MG tablet Take 1 tablet by mouth Daily.     No current facility-administered medications on file prior to visit.       Immunization History   Administered Date(s) Administered    COVID-19 (PFIZER) Purple Cap Monovalent 05/28/2021, 06/18/2021    Tdap 03/23/2016       Review of Systems   Constitutional:  Negative for fatigue and fever.   HENT:  Negative for ear pain and sore throat.    Eyes:  Negative for blurred vision.   Respiratory:  Negative for cough and shortness of breath.    Cardiovascular:  Negative for chest pain, palpitations and leg swelling.   Gastrointestinal:  Negative for abdominal pain, blood in stool, constipation, diarrhea, nausea and vomiting.   Genitourinary:  Negative for difficulty urinating.  "  Musculoskeletal:  Positive for arthralgias (occ left elbow pain with repetitive work, wears elbow splint that helps). Negative for myalgias.   Skin:  Negative for rash.   Neurological:  Negative for dizziness, weakness and headache.   Psychiatric/Behavioral:  Negative for sleep disturbance and depressed mood.         Objective     Vitals:    05/23/24 0917   BP: 97/63   BP Location: Right arm   Patient Position: Sitting   Pulse: 76   Weight: 78.1 kg (172 lb 3.2 oz)   Height: 179.1 cm (70.5\")     Waist Circumference: 86.4 cm (2' 10\")      Physical Exam  Vitals reviewed.   Constitutional:       Appearance: Normal appearance. He is well-developed.   HENT:      Head: Normocephalic and atraumatic.      Right Ear: External ear normal.      Left Ear: External ear normal.      Mouth/Throat:      Pharynx: No oropharyngeal exudate.   Eyes:      Conjunctiva/sclera: Conjunctivae normal.      Pupils: Pupils are equal, round, and reactive to light.   Cardiovascular:      Rate and Rhythm: Normal rate and regular rhythm.      Heart sounds: No murmur heard.     No friction rub. No gallop.   Pulmonary:      Effort: Pulmonary effort is normal.      Breath sounds: Normal breath sounds. No wheezing or rhonchi.   Abdominal:      General: Bowel sounds are normal. There is no distension.      Palpations: Abdomen is soft.      Tenderness: There is no abdominal tenderness.      Comments: Waist Circumference: 86.4 cm (2' 10\")     Skin:     General: Skin is warm and dry.   Neurological:      Mental Status: He is alert and oriented to person, place, and time.      Cranial Nerves: No cranial nerve deficit.   Psychiatric:         Mood and Affect: Mood and affect normal.         Behavior: Behavior normal.         Thought Content: Thought content normal.         Judgment: Judgment normal.         Result Review :     The following data was reviewed by: ODETTE Avila on 05/23/2024:                       Assessment and Plan  "     Diagnoses and all orders for this visit:    1. Screening for malignant neoplasm of prostate (Primary)  Assessment & Plan:  Discussed and will screen with PSA     Orders:  -     PSA Screen; Future    2. Routine general medical examination at a health care facility  Assessment & Plan:  Advise regular exercise, healthy eating, always wear seat belts. Tetanus UTD   He is fasting today     Orders:  -     Comprehensive Metabolic Panel; Future  -     CBC & Differential; Future  -     TSH; Future  -     Lipid Panel; Future    3. Other hyperlipidemia  Assessment & Plan:  Continue current medication and efforts with diet and exercise.       Orders:  -     Lipid Panel; Future    4. Essential hypertension  Assessment & Plan:  continue low dose ACE, rechecking urine today    Orders:  -     Comprehensive Metabolic Panel; Future  -     lisinopril (PRINIVIL,ZESTRIL) 5 MG tablet; Take 1 tablet by mouth Daily.  Dispense: 90 tablet; Refill: 1    5. Diabetes mellitus without complication  Assessment & Plan:  Reviewed last A1C, keep his follow up with NIKHIL Bennett, checking his urine today     Orders:  -     Microalbumin / Creatinine Urine Ratio - Urine, Clean Catch; Future        BMI is within normal parameters. No other follow-up for BMI required.         Follow Up     Return for followup pending lab results.    Patient was given instructions and counseling regarding his condition or for health maintenance advice. Please see specific information pulled into the AVS if appropriate.

## 2024-07-01 RX ORDER — EMPAGLIFLOZIN 25 MG/1
25 TABLET, FILM COATED ORAL DAILY
Qty: 30 TABLET | Refills: 5 | Status: SHIPPED | OUTPATIENT
Start: 2024-07-01

## 2024-10-25 ENCOUNTER — OFFICE VISIT (OUTPATIENT)
Dept: DIABETES SERVICES | Facility: CLINIC | Age: 59
End: 2024-10-25
Payer: COMMERCIAL

## 2024-10-25 VITALS
WEIGHT: 170.4 LBS | BODY MASS INDEX: 23.85 KG/M2 | DIASTOLIC BLOOD PRESSURE: 63 MMHG | SYSTOLIC BLOOD PRESSURE: 111 MMHG | HEART RATE: 80 BPM | TEMPERATURE: 97.8 F | HEIGHT: 71 IN | OXYGEN SATURATION: 96 %

## 2024-10-25 DIAGNOSIS — E11.65 UNCONTROLLED TYPE 2 DIABETES MELLITUS WITH HYPERGLYCEMIA: Primary | ICD-10-CM

## 2024-10-25 DIAGNOSIS — N18.2 TYPE 2 DIABETES MELLITUS WITH STAGE 2 CHRONIC KIDNEY DISEASE, WITHOUT LONG-TERM CURRENT USE OF INSULIN: ICD-10-CM

## 2024-10-25 DIAGNOSIS — E11.22 TYPE 2 DIABETES MELLITUS WITH STAGE 2 CHRONIC KIDNEY DISEASE, WITHOUT LONG-TERM CURRENT USE OF INSULIN: ICD-10-CM

## 2024-10-25 LAB
EXPIRATION DATE: ABNORMAL
HBA1C MFR BLD: 7.1 % (ref 4.5–5.7)
Lab: ABNORMAL

## 2024-10-25 RX ORDER — NITROFURANTOIN 25; 75 MG/1; MG/1
100 CAPSULE ORAL 2 TIMES DAILY
COMMUNITY
Start: 2024-10-23

## 2024-10-25 NOTE — PROGRESS NOTES
Chief Complaint  Diabetes (Med mgt, A1C eval)    Referred By: No ref. provider found    Subjective          Cholo Morejon presents to [facility] for diabetes medication management    History of Present Illness    Visit type:  follow-up  Diabetes type:  Type 2  Current diabetes status/concerns/issues:  He states his glucose levels have been a little higher at times  Other health concerns:  He is being treated for UTI currently  Current Diabetes symptoms:    Polyuria: No   Polydipsia: No   Polyphagia: No   Blurred vision: No   Excessive fatigue: No  Known Diabetes complications:  Neuropathy: None; Location: N/A  Renal: Stage II mild (GFR = 60-89 mL/min) and Microalbuminuria - NEGATIVE  Eyes: No current eye exam available in record; Location: N/A  Amputation/Wounds: None  GI: None  Cardiovascular: Hypertension and Hyperlipidemia  ED: None  Other: None  Hypoglycemia:  None reported at this time  Hypoglycemia Symptoms:  No hypoglycemia at this time  Current diabetes treatment:  Jardiance 25 mg once a day and Mounjaro 10 mg once weekly.  He also uses an OTC supplement called [employer] Balance Glucose Control    Blood glucose device:  Meter  Blood glucose monitoring frequency:  1 - 2  Blood glucose range/average:   120's recently; a month or so ago they were higher in the 150s  Glucose Source: Patient Reported  Diet:  Limits high carb/sweet foods, Avoids sugary drinks  Activity/Exercise:   active with work    Past Medical History:   Diagnosis Date    Hx of skin cancer, basal cell     Hyperlipidemia     Hypertension     Type 2 diabetes mellitus      Past Surgical History:   Procedure Laterality Date    SKIN CANCER EXCISION       Family History   Problem Relation Age of Onset    Diabetes type II Other     Heart disease Other     Stroke Other     No Known Problems Sister     Cancer Brother         non hodgkin's lymphoma    Diabetes Maternal Grandmother     Lung cancer Maternal Grandfather     No  "Known Problems Paternal Grandmother     No Known Problems Paternal Grandfather     Coronary artery disease Mother         stent placement    Coronary artery disease Father         triple bypass     Social History     Socioeconomic History    Marital status:    Tobacco Use    Smoking status: Never     Passive exposure: Never    Smokeless tobacco: Never   Vaping Use    Vaping status: Never Used   Substance and Sexual Activity    Alcohol use: Yes     Comment: occasionally    Drug use: Never    Sexual activity: Defer     No Known Allergies    Current Outpatient Medications:     Jardiance 25 MG tablet tablet, TAKE 1 TABLET BY MOUTH DAILY, Disp: 30 tablet, Rfl: 5    lisinopril (PRINIVIL,ZESTRIL) 5 MG tablet, Take 1 tablet by mouth Daily., Disp: 90 tablet, Rfl: 1    nitrofurantoin, macrocrystal-monohydrate, (MACROBID) 100 MG capsule, Take 1 capsule by mouth 2 (Two) Times a Day., Disp: , Rfl:     NON FORMULARY / PATIENT SUPPLIED MEDICATION, ALTI Balance Supplement, Disp: , Rfl:     Tirzepatide (Mounjaro) 10 MG/0.5ML solution pen-injector pen, Inject 0.5 mL under the skin into the appropriate area as directed 1 (One) Time Per Week., Disp: 6 mL, Rfl: 3    vitamin D3 125 MCG (5000 UT) capsule capsule, Take 1 capsule by mouth Daily., Disp: , Rfl:     atorvastatin (LIPITOR) 20 MG tablet, TAKE 1 TABLET BY MOUTH EVERY NIGHT AT BEDTIME, Disp: 90 tablet, Rfl: 1    Objective     Vitals:    10/25/24 1400   BP: 111/63   BP Location: Right arm   Patient Position: Sitting   Cuff Size: Adult   Pulse: 80   Temp: 97.8 °F (36.6 °C)   TempSrc: Temporal   SpO2: 96%   Weight: 77.3 kg (170 lb 6.4 oz)   Height: 179.1 cm (70.5\")     Body mass index is 24.1 kg/m².    Physical Exam  Constitutional:       Appearance: Normal appearance. He is normal weight.   HENT:      Head: Normocephalic and atraumatic.      Right Ear: External ear normal.      Left Ear: External ear normal.      Nose: Nose normal.   Eyes:      Extraocular Movements: " Extraocular movements intact.      Conjunctiva/sclera: Conjunctivae normal.   Pulmonary:      Effort: Pulmonary effort is normal.   Musculoskeletal:         General: Normal range of motion.      Cervical back: Normal range of motion.   Skin:     General: Skin is warm and dry.   Neurological:      General: No focal deficit present.      Mental Status: He is alert and oriented to person, place, and time. Mental status is at baseline.   Psychiatric:         Mood and Affect: Mood normal.         Behavior: Behavior normal.         Thought Content: Thought content normal.         Judgment: Judgment normal.             Result Review :   The following data was reviewed by: ODETTE Yoon on 10/25/2024:    Most Recent A1C          10/25/2024    14:03   HGBA1C Most Recent   Hemoglobin A1C 7.1        A1C Last 3 Results          4/12/2024    13:49 10/25/2024    14:03   HGBA1C Last 3 Results   Hemoglobin A1C 6.6  7.1      A1c collected in the office today is 7.1%, indicating Uncontrolled Type II diabetes.  This result is up from the prior result of 6.6% collected on 4/12/24         Creatinine   Date Value Ref Range Status   05/23/2024 1.12 0.76 - 1.27 mg/dL Final   03/05/2024 1.09 0.76 - 1.27 mg/dL Final     eGFR   Date Value Ref Range Status   05/23/2024 77.1 >60.0 mL/min/1.73 Final   03/05/2024 79.7 >60.0 mL/min/1.73 Final     Labs collected on 5/23/24 show Stage II mild (GFR = 60-89mL/min)    Microalbumin, Urine   Date Value Ref Range Status   05/23/2024 <1.2 mg/dL Final   05/24/2023 <1.2 mg/dL Final     Creatinine, Urine   Date Value Ref Range Status   05/23/2024 71.4 mg/dL Final   05/24/2023 133.7 mg/dL Final     Microalbumin/Creatinine Ratio   Date Value Ref Range Status   05/23/2024   Final     Comment:     Unable to calculate   05/24/2023   Final     Comment:     Unable to calculate     Urine microalbuminuria collected on 5/23/24 is negative for microalbuminuria    Total Cholesterol   Date Value Ref Range  Status   05/23/2024 134 0 - 200 mg/dL Final   11/21/2023 130 0 - 200 mg/dL Final     Triglycerides   Date Value Ref Range Status   05/23/2024 46 0 - 150 mg/dL Final   11/21/2023 49 0 - 150 mg/dL Final     HDL Cholesterol   Date Value Ref Range Status   05/23/2024 48 40 - 60 mg/dL Final   11/21/2023 47 40 - 60 mg/dL Final     LDL Cholesterol    Date Value Ref Range Status   05/23/2024 75 0 - 100 mg/dL Final   11/21/2023 72 0 - 100 mg/dL Final     Lipid panel collected on 5/23/24 shows Hypercholesterolemia            Assessment: He has had an increase in his A1c and is no longer in a controlled status.  Patient states for some unknown reason approximately 1 month ago his glucose levels were elevated in the 150s.  They have now returned back to their lower values.  He has been using some nutritional supplements to also help control his diabetes recently.      Diagnoses and all orders for this visit:    1. Uncontrolled type 2 diabetes mellitus with hyperglycemia (Primary)  -     POC Glycosylated Hemoglobin (Hb A1C)  -     Tirzepatide (Mounjaro) 10 MG/0.5ML solution pen-injector pen; Inject 0.5 mL under the skin into the appropriate area as directed 1 (One) Time Per Week.  Dispense: 6 mL; Refill: 3    2. Type 2 diabetes mellitus with stage 2 chronic kidney disease, without long-term current use of insulin        Plan: No changes were made to the patient's treatment plan today.  He will continue to monitor his glucose levels to ensure they are staying in a controlled status.  If he has return of increased glucose levels or his A1c does not resolve at the next appointment we will consider increasing his Mounjaro.    The patient will monitor his blood glucose levels 1-2 times a day.  If he develops problematic hyperglycemia or hypoglycemia or adverse drug reactions, he will contact the office for further instructions.        Follow Up     Return in about 3 months (around 1/25/2025) for Medication Management.    Patient was  given instructions and counseling regarding his condition or for health maintenance advice. Please see specific information pulled into the AVS if appropriate.     Kiley Bennett, APRN  10/25/2024      Dictated Utilizing Dragon Dictation.  Please note that portions of this note were completed with a voice recognition program.  Part of this note may be an electronic transcription/translation of spoken language to printed text using the Dragon Dictation System.

## 2024-11-01 DIAGNOSIS — E78.49 OTHER HYPERLIPIDEMIA: ICD-10-CM

## 2024-11-01 RX ORDER — ATORVASTATIN CALCIUM 20 MG/1
TABLET, FILM COATED ORAL
Qty: 90 TABLET | Refills: 1 | Status: SHIPPED | OUTPATIENT
Start: 2024-11-01

## 2024-12-07 DIAGNOSIS — I10 ESSENTIAL HYPERTENSION: ICD-10-CM

## 2024-12-09 RX ORDER — LISINOPRIL 5 MG/1
5 TABLET ORAL DAILY
Qty: 90 TABLET | Refills: 0 | Status: SHIPPED | OUTPATIENT
Start: 2024-12-09

## 2024-12-30 RX ORDER — EMPAGLIFLOZIN 25 MG/1
25 TABLET, FILM COATED ORAL DAILY
Qty: 30 TABLET | Refills: 5 | Status: SHIPPED | OUTPATIENT
Start: 2024-12-30

## 2025-02-14 ENCOUNTER — OFFICE VISIT (OUTPATIENT)
Dept: DIABETES SERVICES | Facility: CLINIC | Age: 60
End: 2025-02-14
Payer: COMMERCIAL

## 2025-02-14 ENCOUNTER — LAB (OUTPATIENT)
Dept: LAB | Facility: HOSPITAL | Age: 60
End: 2025-02-14
Payer: COMMERCIAL

## 2025-02-14 VITALS
HEART RATE: 83 BPM | SYSTOLIC BLOOD PRESSURE: 112 MMHG | WEIGHT: 166 LBS | HEIGHT: 71 IN | BODY MASS INDEX: 23.24 KG/M2 | OXYGEN SATURATION: 98 % | DIASTOLIC BLOOD PRESSURE: 66 MMHG

## 2025-02-14 DIAGNOSIS — E11.22 CONTROLLED TYPE 2 DIABETES MELLITUS WITH STAGE 2 CHRONIC KIDNEY DISEASE, WITHOUT LONG-TERM CURRENT USE OF INSULIN: ICD-10-CM

## 2025-02-14 DIAGNOSIS — E11.9 CONTROLLED TYPE 2 DIABETES MELLITUS WITHOUT COMPLICATION, WITHOUT LONG-TERM CURRENT USE OF INSULIN: ICD-10-CM

## 2025-02-14 DIAGNOSIS — N18.2 CONTROLLED TYPE 2 DIABETES MELLITUS WITH STAGE 2 CHRONIC KIDNEY DISEASE, WITHOUT LONG-TERM CURRENT USE OF INSULIN: ICD-10-CM

## 2025-02-14 LAB
ALBUMIN UR-MCNC: <1.2 MG/DL
CREAT UR-MCNC: 102.1 MG/DL
EXPIRATION DATE: ABNORMAL
HBA1C MFR BLD: 6.9 % (ref 4.5–5.7)
Lab: ABNORMAL
MICROALBUMIN/CREAT UR: NORMAL MG/G{CREAT}

## 2025-02-14 PROCEDURE — 82043 UR ALBUMIN QUANTITATIVE: CPT

## 2025-02-14 PROCEDURE — 82570 ASSAY OF URINE CREATININE: CPT

## 2025-02-14 PROCEDURE — 99214 OFFICE O/P EST MOD 30 MIN: CPT | Performed by: NURSE PRACTITIONER

## 2025-02-14 NOTE — PROGRESS NOTES
Chief Complaint  Diabetes (Med management, A1C)    Referred By: No ref. provider found    Subjective          Patient or patient representative verbalized consent for the use of Ambient Listening during the visit with  ODETTE Yoon for chart documentation. 2/19/2025  14:56 RAHEL Morejon presents to [facility] for diabetes medication management    History of Present Illness    Visit type:  follow-up  Diabetes type:  Type 2  Current diabetes status/concerns/issues:     History of Present Illness  The patient presents for evaluation of diabetes.    He reports no significant complications related to his diabetes at present. He has been adhering to a regimen of Jardiance 25 mg daily and Mounjaro 10 mg weekly, supplemented with an over-the-counter glucose control supplement. He is not experiencing any adverse effects from these medications. He monitors his blood glucose levels at home, typically checking them a few hours post-breakfast, with readings generally falling within the range of 120 to 130. He also reports fluctuations in his weight, which he diligently monitors. He reports no new health concerns.        Current Diabetes symptoms:    Polyuria: No   Polydipsia: No   Polyphagia: No   Blurred vision: No   Excessive fatigue: No  Known Diabetes complications:  Neuropathy: None; Location: N/A  Renal: Stage II mild (GFR = 60-89 mL/min) and Microalbuminuria - NEGATIVE  Eyes: No current eye exam available in record; Location: N/A  Amputation/Wounds: None  GI: None  Cardiovascular: Hypertension and Hyperlipidemia  ED: None  Other: None  Hypoglycemia:  None reported at this time  Hypoglycemia Symptoms:  No hypoglycemia at this time  Current diabetes treatment:  Jardiance 25 mg once a day and Mounjaro 10 mg once weekly.  He also uses an OTC supplement called ALTAI Balance Glucose Control    Blood glucose device:  Meter  Blood glucose monitoring frequency:  1  Blood glucose  range/average:   120-130  Glucose Source: Patient Reported  Diet:  Limits high carb/sweet foods, Avoids sugary drinks  Activity/Exercise:  None    Past Medical History:   Diagnosis Date    Hx of skin cancer, basal cell     Hyperlipidemia     Hypertension     Type 2 diabetes mellitus      Past Surgical History:   Procedure Laterality Date    SKIN CANCER EXCISION       Family History   Problem Relation Age of Onset    Diabetes type II Other     Heart disease Other     Stroke Other     No Known Problems Sister     Cancer Brother         non hodgkin's lymphoma    Diabetes Maternal Grandmother     Lung cancer Maternal Grandfather     No Known Problems Paternal Grandmother     No Known Problems Paternal Grandfather     Coronary artery disease Mother         stent placement    Coronary artery disease Father         triple bypass     Social History     Socioeconomic History    Marital status:    Tobacco Use    Smoking status: Never     Passive exposure: Never    Smokeless tobacco: Never   Vaping Use    Vaping status: Never Used   Substance and Sexual Activity    Alcohol use: Yes     Comment: occasionally    Drug use: Never    Sexual activity: Defer     No Known Allergies    Current Outpatient Medications:     atorvastatin (LIPITOR) 20 MG tablet, TAKE 1 TABLET BY MOUTH EVERY NIGHT AT BEDTIME, Disp: 90 tablet, Rfl: 1    Jardiance 25 MG tablet tablet, TAKE 1 TABLET BY MOUTH DAILY, Disp: 30 tablet, Rfl: 5    lisinopril (PRINIVIL,ZESTRIL) 5 MG tablet, TAKE 1 TABLET BY MOUTH ONCE DAILY, Disp: 90 tablet, Rfl: 0    NON FORMULARY / PATIENT SUPPLIED MEDICATION, ALTI Balance Supplement, Disp: , Rfl:     Tirzepatide (Mounjaro) 10 MG/0.5ML solution pen-injector pen, Inject 0.5 mL under the skin into the appropriate area as directed 1 (One) Time Per Week., Disp: 6 mL, Rfl: 3    vitamin D3 125 MCG (5000 UT) capsule capsule, Take 1 capsule by mouth Daily., Disp: , Rfl:     Objective     Vitals:    02/14/25 1413   BP: 112/66   BP  "Location: Right arm   Patient Position: Sitting   Cuff Size: Adult   Pulse: 83   SpO2: 98%   Weight: 75.3 kg (166 lb)   Height: 179.1 cm (70.5\")     Body mass index is 23.48 kg/m².    Physical Exam  Constitutional:       Appearance: Normal appearance. He is normal weight.   HENT:      Head: Normocephalic and atraumatic.      Right Ear: External ear normal.      Left Ear: External ear normal.      Nose: Nose normal.   Eyes:      Extraocular Movements: Extraocular movements intact.      Conjunctiva/sclera: Conjunctivae normal.   Pulmonary:      Effort: Pulmonary effort is normal.   Musculoskeletal:         General: Normal range of motion.      Cervical back: Normal range of motion.   Skin:     General: Skin is warm and dry.   Neurological:      General: No focal deficit present.      Mental Status: He is alert and oriented to person, place, and time. Mental status is at baseline.   Psychiatric:         Mood and Affect: Mood normal.         Behavior: Behavior normal.         Thought Content: Thought content normal.         Judgment: Judgment normal.             Result Review :   The following data was reviewed by: ODETTE Yoon on 02/14/2025:    Most Recent A1C          2/14/2025    14:22   HGBA1C Most Recent   Hemoglobin A1C 6.9        A1C Last 3 Results          4/12/2024    13:49 10/25/2024    14:03 2/14/2025    14:22   HGBA1C Last 3 Results   Hemoglobin A1C 6.6  7.1  6.9      A1c collected in the office today is 6.9%, indicating Controlled Type II diabetes.  This result is down from the prior result of 7.1% collected on 10/25/24               Diagnoses and all orders for this visit:    1. Controlled type 2 diabetes mellitus with stage 2 chronic kidney disease, without long-term current use of insulin  -     Microalbumin / Creatinine Urine Ratio - Urine, Clean Catch; Future  -     POC Glycosylated Hemoglobin (Hb A1C)        Assessment & Plan  1. Diabetes mellitus.  His blood glucose levels have been " well-regulated, typically ranging between 120 and 130 postprandially. He has experienced a weight loss of approximately 4 pounds, and his Body Mass Index (BMI) remains within a healthy range. His Hemoglobin A1c level has improved from 7.1 to 6.9. He has a sufficient supply of Mounjaro for the upcoming year. A urine sample will be collected today for routine annual testing to ensure there is no proteinuria.          The patient will monitor his blood glucose levels 1 time daily.  If he develops problematic hyperglycemia or hypoglycemia or adverse drug reactions, he will contact the office for further instructions.        Follow Up     Return in about 3 months (around 5/14/2025) for Medication Management.    Patient was given instructions and counseling regarding his condition or for health maintenance advice. Please see specific information pulled into the AVS if appropriate.     Kiley Bennett, APRN  02/14/2025        Dictated Utilizing Dragon Dictation.  Please note that portions of this note were completed with a voice recognition program.  Part of this note may be an electronic transcription/translation of spoken language to printed text using the Dragon Dictation System.

## 2025-03-31 DIAGNOSIS — I10 ESSENTIAL HYPERTENSION: ICD-10-CM

## 2025-03-31 RX ORDER — LISINOPRIL 5 MG/1
5 TABLET ORAL DAILY
Qty: 90 TABLET | Refills: 0 | OUTPATIENT
Start: 2025-03-31

## 2025-03-31 NOTE — TELEPHONE ENCOUNTER
Rx Refill Note  Requested Prescriptions     Pending Prescriptions Disp Refills    lisinopril (PRINIVIL,ZESTRIL) 5 MG tablet [Pharmacy Med Name: lisinopril 5 mg tablet] 90 tablet 0     Sig: TAKE 1 TABLET BY MOUTH ONCE DAILY      Last office visit with prescribing clinician: 5/23/2024   Last telemedicine visit with prescribing clinician: Visit date not found   Next office visit with prescribing clinician: 04/03/25.   Pt said he has enough pills to last until his appt.         Velma Lund LPN  03/31/25, 10:54 EDT

## 2025-04-03 ENCOUNTER — OFFICE VISIT (OUTPATIENT)
Dept: FAMILY MEDICINE CLINIC | Age: 60
End: 2025-04-03
Payer: COMMERCIAL

## 2025-04-03 ENCOUNTER — LAB (OUTPATIENT)
Dept: LAB | Facility: HOSPITAL | Age: 60
End: 2025-04-03
Payer: COMMERCIAL

## 2025-04-03 VITALS
HEART RATE: 89 BPM | TEMPERATURE: 98 F | OXYGEN SATURATION: 99 % | SYSTOLIC BLOOD PRESSURE: 106 MMHG | HEIGHT: 71 IN | DIASTOLIC BLOOD PRESSURE: 67 MMHG | WEIGHT: 168 LBS | RESPIRATION RATE: 16 BRPM | BODY MASS INDEX: 23.52 KG/M2

## 2025-04-03 DIAGNOSIS — E78.49 OTHER HYPERLIPIDEMIA: ICD-10-CM

## 2025-04-03 DIAGNOSIS — R53.83 OTHER FATIGUE: ICD-10-CM

## 2025-04-03 DIAGNOSIS — Z00.00 ROUTINE GENERAL MEDICAL EXAMINATION AT A HEALTH CARE FACILITY: ICD-10-CM

## 2025-04-03 DIAGNOSIS — M25.512 CHRONIC LEFT SHOULDER PAIN: ICD-10-CM

## 2025-04-03 DIAGNOSIS — G89.29 CHRONIC LEFT SHOULDER PAIN: ICD-10-CM

## 2025-04-03 DIAGNOSIS — E11.9 DIABETES MELLITUS WITHOUT COMPLICATION: ICD-10-CM

## 2025-04-03 DIAGNOSIS — I10 ESSENTIAL HYPERTENSION: ICD-10-CM

## 2025-04-03 DIAGNOSIS — E55.9 VITAMIN D DEFICIENCY: ICD-10-CM

## 2025-04-03 DIAGNOSIS — E11.9 DIABETES MELLITUS WITHOUT COMPLICATION: Primary | ICD-10-CM

## 2025-04-03 LAB
25(OH)D3 SERPL-MCNC: 34.8 NG/ML (ref 30–100)
ALBUMIN SERPL-MCNC: 4.4 G/DL (ref 3.5–5.2)
ALBUMIN/GLOB SERPL: 1.5 G/DL
ALP SERPL-CCNC: 101 U/L (ref 39–117)
ALT SERPL W P-5'-P-CCNC: 26 U/L (ref 1–41)
ANION GAP SERPL CALCULATED.3IONS-SCNC: 12.4 MMOL/L (ref 5–15)
AST SERPL-CCNC: 22 U/L (ref 1–40)
BASOPHILS # BLD AUTO: 0.01 10*3/MM3 (ref 0–0.2)
BASOPHILS NFR BLD AUTO: 0.2 % (ref 0–1.5)
BILIRUB SERPL-MCNC: 0.8 MG/DL (ref 0–1.2)
BUN SERPL-MCNC: 18 MG/DL (ref 6–20)
BUN/CREAT SERPL: 16.1 (ref 7–25)
CALCIUM SPEC-SCNC: 9.6 MG/DL (ref 8.6–10.5)
CHLORIDE SERPL-SCNC: 101 MMOL/L (ref 98–107)
CHOLEST SERPL-MCNC: 119 MG/DL (ref 0–200)
CO2 SERPL-SCNC: 23.6 MMOL/L (ref 22–29)
CREAT SERPL-MCNC: 1.12 MG/DL (ref 0.76–1.27)
DEPRECATED RDW RBC AUTO: 40.8 FL (ref 37–54)
EGFRCR SERPLBLD CKD-EPI 2021: 76.6 ML/MIN/1.73
EOSINOPHIL # BLD AUTO: 0.01 10*3/MM3 (ref 0–0.4)
EOSINOPHIL NFR BLD AUTO: 0.2 % (ref 0.3–6.2)
ERYTHROCYTE [DISTWIDTH] IN BLOOD BY AUTOMATED COUNT: 12.5 % (ref 12.3–15.4)
GLOBULIN UR ELPH-MCNC: 2.9 GM/DL
GLUCOSE SERPL-MCNC: 105 MG/DL (ref 65–99)
HCT VFR BLD AUTO: 45.9 % (ref 37.5–51)
HDLC SERPL-MCNC: 49 MG/DL (ref 40–60)
HGB BLD-MCNC: 15.6 G/DL (ref 13–17.7)
IMM GRANULOCYTES # BLD AUTO: 0 10*3/MM3 (ref 0–0.05)
IMM GRANULOCYTES NFR BLD AUTO: 0 % (ref 0–0.5)
LDLC SERPL CALC-MCNC: 59 MG/DL (ref 0–100)
LDLC/HDLC SERPL: 1.26 {RATIO}
LYMPHOCYTES # BLD AUTO: 1.18 10*3/MM3 (ref 0.7–3.1)
LYMPHOCYTES NFR BLD AUTO: 26.6 % (ref 19.6–45.3)
MCH RBC QN AUTO: 30.2 PG (ref 26.6–33)
MCHC RBC AUTO-ENTMCNC: 34 G/DL (ref 31.5–35.7)
MCV RBC AUTO: 88.8 FL (ref 79–97)
MONOCYTES # BLD AUTO: 0.25 10*3/MM3 (ref 0.1–0.9)
MONOCYTES NFR BLD AUTO: 5.6 % (ref 5–12)
NEUTROPHILS NFR BLD AUTO: 2.99 10*3/MM3 (ref 1.7–7)
NEUTROPHILS NFR BLD AUTO: 67.4 % (ref 42.7–76)
PLATELET # BLD AUTO: 181 10*3/MM3 (ref 140–450)
PMV BLD AUTO: 8.3 FL (ref 6–12)
POTASSIUM SERPL-SCNC: 4.2 MMOL/L (ref 3.5–5.2)
PROT SERPL-MCNC: 7.3 G/DL (ref 6–8.5)
RBC # BLD AUTO: 5.17 10*6/MM3 (ref 4.14–5.8)
SODIUM SERPL-SCNC: 137 MMOL/L (ref 136–145)
TRIGL SERPL-MCNC: 42 MG/DL (ref 0–150)
TSH SERPL DL<=0.05 MIU/L-ACNC: 2.09 UIU/ML (ref 0.27–4.2)
VIT B12 BLD-MCNC: 786 PG/ML (ref 211–946)
VLDLC SERPL-MCNC: 11 MG/DL (ref 5–40)
WBC NRBC COR # BLD AUTO: 4.44 10*3/MM3 (ref 3.4–10.8)

## 2025-04-03 PROCEDURE — 82607 VITAMIN B-12: CPT

## 2025-04-03 PROCEDURE — 80050 GENERAL HEALTH PANEL: CPT

## 2025-04-03 PROCEDURE — 80061 LIPID PANEL: CPT

## 2025-04-03 PROCEDURE — 82306 VITAMIN D 25 HYDROXY: CPT

## 2025-04-03 PROCEDURE — 36415 COLL VENOUS BLD VENIPUNCTURE: CPT

## 2025-04-03 RX ORDER — ATORVASTATIN CALCIUM 20 MG/1
20 TABLET, FILM COATED ORAL
Qty: 90 TABLET | Refills: 1 | Status: SHIPPED | OUTPATIENT
Start: 2025-04-03

## 2025-04-03 RX ORDER — LISINOPRIL 5 MG/1
5 TABLET ORAL DAILY
Qty: 90 TABLET | Refills: 1 | Status: SHIPPED | OUTPATIENT
Start: 2025-04-03

## 2025-04-03 NOTE — ASSESSMENT & PLAN NOTE
Send for last eye exam, monitor feet daily, if he needs help trimming his nails, let me know and will send to podiatry, follow up with NIKHIL Bennett as directed

## 2025-04-03 NOTE — ASSESSMENT & PLAN NOTE
Looks like in the past his vit d was low and he was on a rx vit d, he continues on vit d supplement daily, will recheck lab today

## 2025-04-03 NOTE — ASSESSMENT & PLAN NOTE
Hypertension is stable. advised to monitor BP at home. Continue current med. Continue to modify diet and lifestyle. He is fasting today

## 2025-04-03 NOTE — PROGRESS NOTES
Chief Complaint  Hyperlipidemia (Fasting today, needs refills ), Hypertension, Diabetes, Med Refill, and Annual Exam    Subjective          Cholo Morejon presents to [facility]    History of Present Illness  General Health Questions  Regular exercise as least 3 days a week: yes  Follows a healthy diet: yes  Wears seat belt always: yes   Drinks Alcohol: rarely   Uses Recreational drugs: none   Uses tobacco products: none   UTD on Tetanus vaccine: 3-2016  Last PSA:5-2024 normal   Last colon screen:  Dental exam: yes, UTD  Optometrist : UTD went a few months ago, Dr Armando, his eye pressure has been up and they are closing monitoring       Hyperlipidemia  Current medication: lipitor   Tolerating medication: Yes  Needs Refill: Yes crume    Lab Results       Component                Value               Date                       CHOL                     134                 05/23/2024                 CHLPL                    113                 05/23/2020                 TRIG                     46                  05/23/2024                 HDL                      48                  05/23/2024                 LDL                      75                  05/23/2024                Diabetes:  Adalberto Bennett  Current medication: jardiance and GLP 1   Tolerating medication: Yes  Last eye exam: UTD  Last foot exam:  over a year   At home BS ranges: 140 or less   Lab Results       Component                Value               Date                       HGBA1C                   6.9 (A)             02/14/2025              Hypertension:  Current medication: lisinopril   Tolerating Medication: Yes  Checking BP at home and it is: not checking   Needs refills: Yes crume  Labs:  Lab Results       Component                Value               Date                       GLUCOSE                  114 (H)             05/23/2024                 BUN                      19                  05/23/2024                  CREATININE               1.12                2024                 EGFRIFNONA               88                  2021                 BCR                      17.0                2024                 K                        4.6                 2024                 CO2                      24.6                2024                 CALCIUM                  9.7                 2024                 ALBUMIN                  4.8                 2024                 AST                      20                  2024                 ALT                      32                  2024                PAST MEDICAL HISTORY changes since 2024:       Skin cancer,  Dg & treated , face   HTN/ HLD     Type 2 Diabetes /sees V Paddy   Abnormal eye pressure, being followed by Dr Armando     basal skin cancer left side of face / an left arm and left scapula   [facility]         PREVENTIVE HEALTH MAINTENANCE             COLORECTAL CANCER SCREENING: Up to date (colonoscopy q10y; sigmoidoscopy q5y; Cologuard q3y) was last done 10-26-18, Results are in chart; colonoscopy with the following abnormalities noted-- spastic colon/ marginal prep         Surgical History:       PE tubes as a child     Procedures:    Colonoscopy (  )         Family History:        Father: Coronary Artery Disease     Mother: Coronary Artery Disease;  Type 2 Diabetes  76/ renal failure     Brother(s): 2 brother(s) total; 1 ;  Lymphoma ( non Hodgkins at 19 y/o )     Sister(s): 1 sister(s) total;  Anxiety     Son(s): Healthy; 1 son(s) total     Daughter(s): Healthy; 2 daughter(s) total     Paternal Grandfather:  at age 60's; Cause of death was cancer     Paternal Grandmother:  at age 80's     Maternal Grandfather:  at age 80's     Maternal Grandmother:  at age 80's;  Type 2 Diabetes         Social History:        Occupation: [employer]     Marital Status:       Children: 3 children                    Past Medical History:   Diagnosis Date    Hx of skin cancer, basal cell     Hyperlipidemia     Hypertension     Type 2 diabetes mellitus        No Known Allergies     Past Surgical History:   Procedure Laterality Date    SKIN CANCER EXCISION          Social History     Tobacco Use    Smoking status: Never     Passive exposure: Never    Smokeless tobacco: Never   Substance Use Topics    Alcohol use: Yes     Comment: occasionally       Family History   Problem Relation Age of Onset    Diabetes type II Other     Heart disease Other     Stroke Other     No Known Problems Sister     Cancer Brother         non hodgkin's lymphoma    Diabetes Maternal Grandmother     Lung cancer Maternal Grandfather     No Known Problems Paternal Grandmother     No Known Problems Paternal Grandfather     Coronary artery disease Mother         stent placement    Coronary artery disease Father         triple bypass        Health Maintenance Due   Topic Date Due    Hepatitis B (1 of 3 - 19+ 3-dose series) Never done    Pneumococcal Vaccine 50+ (1 of 2 - PCV) Never done    ZOSTER VACCINE (1 of 2) Never done    DIABETIC FOOT EXAM  05/24/2024    COVID-19 Vaccine (3 - 2024-25 season) 09/01/2024    DIABETIC EYE EXAM  09/29/2024        Current Outpatient Medications on File Prior to Visit   Medication Sig    Jardiance 25 MG tablet tablet TAKE 1 TABLET BY MOUTH DAILY    NON FORMULARY / PATIENT SUPPLIED MEDICATION ALTI Balance Supplement    Tirzepatide (Mounjaro) 10 MG/0.5ML solution pen-injector pen Inject 0.5 mL under the skin into the appropriate area as directed 1 (One) Time Per Week.    vitamin D3 125 MCG (5000 UT) capsule capsule Take 1 capsule by mouth Daily.    [DISCONTINUED] atorvastatin (LIPITOR) 20 MG tablet TAKE 1 TABLET BY MOUTH EVERY NIGHT AT BEDTIME    [DISCONTINUED] lisinopril (PRINIVIL,ZESTRIL) 5 MG tablet TAKE 1 TABLET BY MOUTH ONCE DAILY     No current facility-administered  "medications on file prior to visit.       Immunization History   Administered Date(s) Administered    COVID-19 (PFIZER) Purple Cap Monovalent 05/28/2021, 06/18/2021    Tdap 03/23/2016       Review of Systems   Constitutional:  Positive for fatigue (takes a vit d supplement, had low vit d in the past). Negative for fever.   HENT:  Negative for ear pain and sore throat.    Eyes:  Negative for blurred vision.   Respiratory:  Negative for cough and shortness of breath.    Cardiovascular:  Negative for chest pain, palpitations and leg swelling.   Gastrointestinal:  Negative for abdominal pain, constipation, diarrhea, nausea and vomiting.   Genitourinary:  Negative for difficulty urinating.   Musculoskeletal:  Positive for arthralgias (shoulder pains, L > R, made a change at work that is helping). Negative for myalgias.   Skin:  Negative for rash.   Neurological:  Negative for dizziness, weakness and headache.   Psychiatric/Behavioral:  Negative for sleep disturbance and depressed mood.         Objective     Vitals:    04/03/25 0810   BP: 106/67   BP Location: Left arm   Patient Position: Sitting   Cuff Size: Adult   Pulse: 89   Resp: 16   Temp: 98 °F (36.7 °C)   TempSrc: Oral   SpO2: 99%  Comment: room air   Weight: 76.2 kg (168 lb)   Height: 179.1 cm (70.51\")            Physical Exam  Vitals reviewed.   Constitutional:       Appearance: Normal appearance. He is well-developed.   HENT:      Head: Normocephalic and atraumatic.      Right Ear: External ear normal.      Left Ear: External ear normal.      Mouth/Throat:      Pharynx: No oropharyngeal exudate.   Eyes:      Conjunctiva/sclera: Conjunctivae normal.      Pupils: Pupils are equal, round, and reactive to light.   Neck:      Vascular: No carotid bruit.   Cardiovascular:      Rate and Rhythm: Normal rate and regular rhythm.      Pulses:           Posterior tibial pulses are 2+ on the right side and 2+ on the left side.      Heart sounds: No murmur heard.     No " friction rub. No gallop.   Pulmonary:      Effort: Pulmonary effort is normal. No respiratory distress.      Breath sounds: Normal breath sounds. No wheezing or rhonchi.   Abdominal:      General: Bowel sounds are normal. There is no distension.      Palpations: Abdomen is soft.      Tenderness: There is no abdominal tenderness.      Comments:       Musculoskeletal:         General: No swelling or tenderness (to palpation of left shoulder but pain with ROM).   Feet:      Right foot:      Protective Sensation: 3 sites tested.  3 sites sensed.      Skin integrity: Skin integrity normal. Dry skin present. No ulcer or blister.      Toenail Condition: Right toenails are abnormally thick.      Left foot:      Protective Sensation: 3 sites tested.  3 sites sensed.      Skin integrity: Skin integrity normal. Dry skin present. No ulcer or blister.      Toenail Condition: Left toenails are abnormally thick.      Comments: Diabetic Foot Exam Performed and Monofilament Test Performed     Skin:     General: Skin is warm and dry.   Neurological:      Mental Status: He is alert and oriented to person, place, and time.      Cranial Nerves: No cranial nerve deficit.   Psychiatric:         Mood and Affect: Mood and affect normal.         Behavior: Behavior normal.         Thought Content: Thought content normal.         Judgment: Judgment normal.         Result Review :     The following data was reviewed by: ODETTE Avila on 04/03/2025:                       Assessment and Plan      Diagnoses and all orders for this visit:    1. Diabetes mellitus without complication (Primary)  Assessment & Plan:  Send for last eye exam, monitor feet daily, if he needs help trimming his nails, let me know and will send to podiatry, follow up with NIKHIL Bennett as directed     Orders:  -     Comprehensive Metabolic Panel; Future    2. Essential hypertension  Assessment & Plan:  Hypertension is stable. advised to monitor BP at home. Continue  current med. Continue to modify diet and lifestyle. He is fasting today     Orders:  -     Comprehensive Metabolic Panel; Future  -     lisinopril (PRINIVIL,ZESTRIL) 5 MG tablet; Take 1 tablet by mouth Daily.  Dispense: 90 tablet; Refill: 1    3. Other hyperlipidemia  Assessment & Plan:   Continue current medication and efforts with diet and exercise.       Orders:  -     Comprehensive Metabolic Panel; Future  -     Lipid Panel; Future  -     atorvastatin (LIPITOR) 20 MG tablet; Take 1 tablet by mouth every night at bedtime.  Dispense: 90 tablet; Refill: 1    4. Routine general medical examination at a health care facility  Assessment & Plan:  Advise regular exercise, healthy eating, always wear seat belts.   Immunizations discussed, declines any updates today.  Last PSA was less than a year ago.   To continue yearly optometry and dental exams.        Orders:  -     Comprehensive Metabolic Panel; Future  -     CBC & Differential; Future  -     TSH; Future  -     Lipid Panel; Future    5. Vitamin D deficiency  Assessment & Plan:  Looks like in the past his vit d was low and he was on a rx vit d, he continues on vit d supplement daily, will recheck lab today     Orders:  -     Vitamin D 25 hydroxy; Future    6. Other fatigue  Assessment & Plan:  Discussed herbs/ vitamins, reviewed his chart, checking some labs     Orders:  -     Vitamin D 25 hydroxy; Future  -     Vitamin B12; Future    7. Chronic left shoulder pain  Assessment & Plan:  If not continuing to  improve, let me know and will get an x-ray           BMI is within normal parameters. No other follow-up for BMI required.         Follow Up     Return if symptoms worsen or fail to improve, for followup pending lab results.    Patient was given instructions and counseling regarding his condition or for health maintenance advice. Please see specific information pulled into the AVS if appropriate.

## 2025-04-03 NOTE — ASSESSMENT & PLAN NOTE
Advise regular exercise, healthy eating, always wear seat belts.   Immunizations discussed, declines any updates today.  Last PSA was less than a year ago.   To continue yearly optometry and dental exams.

## 2025-04-04 ENCOUNTER — RESULTS FOLLOW-UP (OUTPATIENT)
Dept: LAB | Facility: HOSPITAL | Age: 60
End: 2025-04-04
Payer: COMMERCIAL

## 2025-04-04 NOTE — LETTER
Cholo Morejon  [address] [place] [address]    April 9, 2025     Cholo,     Your vitamin B 12 was normal.  Your thyroid lab was normal.  Your blood count was fine.  Your glucose was 105, otherwise your metabolic panel was normal.  Your cholesterol panel was at goal.  Your vitamin d was in the lower end of normal.  I would increase your dose of Vitamin D supplement, try taking 2000 IU twice and day and see how you feel. I can recheck that lab in 6 months.    Below are the results from your recent visit:    Resulted Orders   Comprehensive Metabolic Panel   Result Value Ref Range    Glucose 105 (H) 65 - 99 mg/dL    BUN 18 6 - 20 mg/dL    Creatinine 1.12 0.76 - 1.27 mg/dL    Sodium 137 136 - 145 mmol/L    Potassium 4.2 3.5 - 5.2 mmol/L    Chloride 101 98 - 107 mmol/L    CO2 23.6 22.0 - 29.0 mmol/L    Calcium 9.6 8.6 - 10.5 mg/dL    Total Protein 7.3 6.0 - 8.5 g/dL    Albumin 4.4 3.5 - 5.2 g/dL    ALT (SGPT) 26 1 - 41 U/L    AST (SGOT) 22 1 - 40 U/L    Alkaline Phosphatase 101 39 - 117 U/L    Total Bilirubin 0.8 0.0 - 1.2 mg/dL    Globulin 2.9 gm/dL    A/G Ratio 1.5 g/dL    BUN/Creatinine Ratio 16.1 7.0 - 25.0    Anion Gap 12.4 5.0 - 15.0 mmol/L    eGFR 76.6 >60.0 mL/min/1.73   TSH   Result Value Ref Range    TSH 2.090 0.270 - 4.200 uIU/mL   Lipid Panel   Result Value Ref Range    Total Cholesterol 119 0 - 200 mg/dL    Triglycerides 42 0 - 150 mg/dL    HDL Cholesterol 49 40 - 60 mg/dL    LDL Cholesterol  59 0 - 100 mg/dL    VLDL Cholesterol 11 5 - 40 mg/dL    LDL/HDL Ratio 1.26    Vitamin D 25 hydroxy   Result Value Ref Range    25 Hydroxy, Vitamin D 34.8 30.0 - 100.0 ng/ml   Vitamin B12   Result Value Ref Range    Vitamin B-12 786 211 - 946 pg/mL   CBC Auto Differential   Result Value Ref Range    WBC 4.44 3.40 - 10.80 10*3/mm3    RBC 5.17 4.14 - 5.80 10*6/mm3    Hemoglobin 15.6 13.0 - 17.7 g/dL    Hematocrit 45.9 37.5 - 51.0 %    MCV 88.8 79.0 - 97.0 fL    MCH 30.2 26.6 - 33.0 pg    MCHC 34.0 31.5 - 35.7 g/dL     RDW 12.5 12.3 - 15.4 %    RDW-SD 40.8 37.0 - 54.0 fl    MPV 8.3 6.0 - 12.0 fL    Platelets 181 140 - 450 10*3/mm3    Neutrophil % 67.4 42.7 - 76.0 %    Lymphocyte % 26.6 19.6 - 45.3 %    Monocyte % 5.6 5.0 - 12.0 %    Eosinophil % 0.2 (L) 0.3 - 6.2 %    Basophil % 0.2 0.0 - 1.5 %    Immature Grans % 0.0 0.0 - 0.5 %    Neutrophils, Absolute 2.99 1.70 - 7.00 10*3/mm3    Lymphocytes, Absolute 1.18 0.70 - 3.10 10*3/mm3    Monocytes, Absolute 0.25 0.10 - 0.90 10*3/mm3    Eosinophils, Absolute 0.01 0.00 - 0.40 10*3/mm3    Basophils, Absolute 0.01 0.00 - 0.20 10*3/mm3    Immature Grans, Absolute 0.00 0.00 - 0.05 10*3/mm3           If you have any questions or concerns, please don't hesitate to call.         Sincerely,        Miriam Moon, APRN

## 2025-04-22 ENCOUNTER — TELEPHONE (OUTPATIENT)
Dept: FAMILY MEDICINE CLINIC | Age: 60
End: 2025-04-22
Payer: COMMERCIAL

## 2025-04-22 NOTE — TELEPHONE ENCOUNTER
Caller: Cholo Morejon    Relationship to patient: Self    Best call back number: [phone]    Chief complaint: N/A    Type of visit: IN OFFICE PROCEDURE STITCH REMOVAL    Requested date: WED 04/23/2025-04/25/2025 FRI     If rescheduling, when is the original appointment: N/A     Additional notes:PATIENT NEEDS TO HAVE STITCHES REMOVED WED THROUGH FRIDAY AN APPT SCHEDULED. PLEASE CALL PATIENT TO SCHEDULE AND ADVISE.

## 2025-04-25 ENCOUNTER — OFFICE VISIT (OUTPATIENT)
Dept: FAMILY MEDICINE CLINIC | Age: 60
End: 2025-04-25
Payer: COMMERCIAL

## 2025-04-25 VITALS
SYSTOLIC BLOOD PRESSURE: 100 MMHG | OXYGEN SATURATION: 99 % | RESPIRATION RATE: 16 BRPM | DIASTOLIC BLOOD PRESSURE: 69 MMHG | TEMPERATURE: 98.2 F | HEIGHT: 71 IN | WEIGHT: 169.6 LBS | HEART RATE: 80 BPM | BODY MASS INDEX: 23.74 KG/M2

## 2025-04-25 DIAGNOSIS — Z48.02 ENCOUNTER FOR REMOVAL OF SUTURES: Primary | ICD-10-CM

## 2025-04-25 RX ORDER — MUPIROCIN 20 MG/G
1 OINTMENT TOPICAL
COMMUNITY
Start: 2025-04-18 | End: 2025-04-25

## 2025-04-25 NOTE — PROGRESS NOTES
Chief Complaint     Suture / Staple Removal (5 STICHES  ABOVE LEFT EYE. PLACED 4/18/25 @ [facility] )    History of Present Illness     Cholo Morejon is a 59 y.o. male who presents to [facility].     Patient or patient representative verbalized consent for the use of Ambient Listening during the visit with  ODETTE Castanon for chart documentation. 4/25/2025  16:17 EDT      History of Present Illness  The patient is a 59-year-old male who presents for removal of stitches.    He sustained an injury on 04/18/2025, resulting in the placement of 5 sutures. The incident occurred when he inadvertently stepped on lid in his driveway, causing him to lose his balance and fall. He landed on his chest, with his head subsequently hitting the ground. Despite the impact, no scrapes or bruises were sustained. A persistent tingling sensation at the site of the injury is reported.         History      Past Medical History:   Diagnosis Date    Hx of skin cancer, basal cell     Hyperlipidemia     Hypertension     Type 2 diabetes mellitus        Past Surgical History:   Procedure Laterality Date    SKIN CANCER EXCISION         Family History   Problem Relation Age of Onset    Diabetes type II Other     Heart disease Other     Stroke Other     No Known Problems Sister     Cancer Brother         non hodgkin's lymphoma    Diabetes Maternal Grandmother     Lung cancer Maternal Grandfather     No Known Problems Paternal Grandmother     No Known Problems Paternal Grandfather     Coronary artery disease Mother         stent placement    Coronary artery disease Father         triple bypass        Current Medications        Current Outpatient Medications:     atorvastatin (LIPITOR) 20 MG tablet, Take 1 tablet by mouth every night at bedtime., Disp: 90 tablet, Rfl: 1    Jardiance 25 MG tablet tablet, TAKE 1 TABLET BY MOUTH DAILY, Disp: 30 tablet, Rfl: 5    lisinopril (PRINIVIL,ZESTRIL) 5 MG tablet, Take 1 tablet  "by mouth Daily., Disp: 90 tablet, Rfl: 1    mupirocin (BACTROBAN) 2 % ointment, Apply 1 Application topically to the appropriate area as directed., Disp: , Rfl:     NON FORMULARY / PATIENT SUPPLIED MEDICATION, ALTI Balance Supplement, Disp: , Rfl:     Tirzepatide (Mounjaro) 10 MG/0.5ML solution pen-injector pen, Inject 0.5 mL under the skin into the appropriate area as directed 1 (One) Time Per Week., Disp: 6 mL, Rfl: 3    vitamin D3 125 MCG (5000 UT) capsule capsule, Take 1 capsule by mouth Daily., Disp: , Rfl:      Allergies     No Known Allergies    Social History       Social History     Social History Narrative    Not on file       Immunizations     Immunization:  Immunization History   Administered Date(s) Administered    COVID-19 (PFIZER) Purple Cap Monovalent 05/28/2021, 06/18/2021    Tdap 03/23/2016          Objective     Objective     Vital Signs:   /69 (BP Location: Left arm, Patient Position: Sitting, Cuff Size: Adult)   Pulse 80   Temp 98.2 °F (36.8 °C) (Oral)   Resp 16   Ht 179.1 cm (70.51\")   Wt 76.9 kg (169 lb 9.6 oz)   SpO2 99% Comment: room air  BMI 23.98 kg/m²       Physical Exam  Vitals and nursing note reviewed.   Constitutional:       Appearance: Normal appearance. He is normal weight.   HENT:      Head: Normocephalic.   Eyes:      Conjunctiva/sclera: Conjunctivae normal.      Pupils: Pupils are equal, round, and reactive to light.      Comments: 5 stitches removed from below the left eyebrow   Cardiovascular:      Rate and Rhythm: Normal rate and regular rhythm.      Pulses: Normal pulses.      Heart sounds: Normal heart sounds.   Pulmonary:      Effort: Pulmonary effort is normal.      Breath sounds: Normal breath sounds.   Abdominal:      General: Bowel sounds are normal.      Palpations: Abdomen is soft.   Musculoskeletal:         General: Normal range of motion.      Cervical back: Normal range of motion and neck supple.   Skin:     General: Skin is warm and dry.      " Findings: Bruising and laceration present.      Comments: Bruising around the left eye, laceration under the left eyebrow.   Neurological:      General: No focal deficit present.      Mental Status: He is alert and oriented to person, place, and time.   Psychiatric:         Attention and Perception: Attention normal.         Mood and Affect: Mood and affect normal.         Behavior: Behavior normal. Behavior is cooperative.         Physical Exam  Eyes: The black eye appears to be healing well.      Results    The following data was reviewed by: ODETTE Castanon on 04/25/25               Results        Suture Removal    Date/Time: 4/25/2025 4:19 PM    Performed by: Carina Hutchison APRN  Authorized by: Carina Hutchison APRN  Body area: head/neck  Location details: left eyebrow  Wound Appearance: clean  Sutures Removed: 5  Patient tolerance: patient tolerated the procedure well with no immediate complications        Assessment and Plan        Assessment and Plan       Encounter for removal of sutures  5 sutures removed from left eyebrow that were placed on 4/18/2025 at [facility] emergency room.            Assessment & Plan  1. Suture removal.  - The wound appears to be healing well, with no signs of infection.  - The black eye is also showing good progress, now in the yellow phase of healing.  - Discussed the incident leading to the injury and confirmed no additional trauma or complications.  - Sutures will be removed today after cleaning the area with alcohol to prevent any bacterial infection.    PROCEDURE  Procedure: Suture Removal (Location: Eyebrow)    All questions were answered and agreement to proceed was given after the following Pre-Procedure details were reviewed:  - Consent: Verbal consent obtained for suture removal    Intra-Procedure:  - Site Preparation: Cleaned the area with alcohol to prevent bacterial contamination    Post-Procedure:  - Tolerance Level: Patient tolerated the procedure  well        Follow Up        Follow Up   Return for With PCP, Annual physical, sooner if condition worsens.  Patient was given instructions and counseling regarding his condition or for health maintenance advice. Please see specific information pulled into the AVS if appropriate.

## 2025-06-13 ENCOUNTER — OFFICE VISIT (OUTPATIENT)
Dept: DIABETES SERVICES | Facility: CLINIC | Age: 60
End: 2025-06-13
Payer: COMMERCIAL

## 2025-06-13 VITALS
WEIGHT: 167 LBS | BODY MASS INDEX: 23.38 KG/M2 | DIASTOLIC BLOOD PRESSURE: 62 MMHG | SYSTOLIC BLOOD PRESSURE: 112 MMHG | HEART RATE: 87 BPM | HEIGHT: 71 IN | OXYGEN SATURATION: 100 %

## 2025-06-13 DIAGNOSIS — N18.2 TYPE 2 DIABETES MELLITUS WITH STAGE 2 CHRONIC KIDNEY DISEASE, WITHOUT LONG-TERM CURRENT USE OF INSULIN: ICD-10-CM

## 2025-06-13 DIAGNOSIS — E11.65 UNCONTROLLED TYPE 2 DIABETES MELLITUS WITH HYPERGLYCEMIA: Primary | ICD-10-CM

## 2025-06-13 DIAGNOSIS — E11.22 TYPE 2 DIABETES MELLITUS WITH STAGE 2 CHRONIC KIDNEY DISEASE, WITHOUT LONG-TERM CURRENT USE OF INSULIN: ICD-10-CM

## 2025-06-13 LAB
EXPIRATION DATE: ABNORMAL
HBA1C MFR BLD: 7.2 % (ref 4.5–5.7)
Lab: ABNORMAL